# Patient Record
Sex: MALE | Race: WHITE | NOT HISPANIC OR LATINO | Employment: OTHER | URBAN - METROPOLITAN AREA
[De-identification: names, ages, dates, MRNs, and addresses within clinical notes are randomized per-mention and may not be internally consistent; named-entity substitution may affect disease eponyms.]

---

## 2017-01-01 ENCOUNTER — TELEPHONE (OUTPATIENT)
Dept: NEUROLOGY | Facility: HOSPITAL | Age: 77
End: 2017-01-01

## 2017-01-01 ENCOUNTER — HOSPITAL ENCOUNTER (INPATIENT)
Facility: HOSPITAL | Age: 77
LOS: 4 days | DRG: 435 | End: 2017-01-29
Attending: SURGERY | Admitting: SURGERY
Payer: MEDICARE

## 2017-01-01 ENCOUNTER — ANESTHESIA (OUTPATIENT)
Dept: MEDSURG UNIT | Facility: HOSPITAL | Age: 77
DRG: 435 | End: 2017-01-01
Payer: MEDICARE

## 2017-01-01 ENCOUNTER — TELEPHONE (OUTPATIENT)
Dept: TRANSPLANT | Facility: CLINIC | Age: 77
End: 2017-01-01

## 2017-01-01 ENCOUNTER — TELEPHONE (OUTPATIENT)
Dept: HEPATOLOGY | Facility: CLINIC | Age: 77
End: 2017-01-01

## 2017-01-01 ENCOUNTER — ANESTHESIA EVENT (OUTPATIENT)
Dept: MEDSURG UNIT | Facility: HOSPITAL | Age: 77
DRG: 435 | End: 2017-01-01
Payer: MEDICARE

## 2017-01-01 VITALS
RESPIRATION RATE: 5 BRPM | BODY MASS INDEX: 26.6 KG/M2 | SYSTOLIC BLOOD PRESSURE: 56 MMHG | WEIGHT: 190 LBS | DIASTOLIC BLOOD PRESSURE: 33 MMHG | OXYGEN SATURATION: 100 % | TEMPERATURE: 99 F | HEIGHT: 71 IN | HEART RATE: 15 BPM

## 2017-01-01 DIAGNOSIS — N18.6 ESRD (END STAGE RENAL DISEASE): ICD-10-CM

## 2017-01-01 DIAGNOSIS — N18.5 ANEMIA OF CHRONIC RENAL FAILURE, STAGE 5: ICD-10-CM

## 2017-01-01 DIAGNOSIS — K74.60 CIRRHOSIS OF LIVER WITH ASCITES, UNSPECIFIED HEPATIC CIRRHOSIS TYPE: ICD-10-CM

## 2017-01-01 DIAGNOSIS — E46 MALNUTRITION COMPROMISING BODILY FUNCTION: ICD-10-CM

## 2017-01-01 DIAGNOSIS — E43 EDEMA DUE TO MALNUTRITION, DUE TO UNSPECIFIED MALNUTRITION TYPE: ICD-10-CM

## 2017-01-01 DIAGNOSIS — T17.908A ASPIRATION INTO AIRWAY, INITIAL ENCOUNTER: ICD-10-CM

## 2017-01-01 DIAGNOSIS — J90 PLEURAL EFFUSION: ICD-10-CM

## 2017-01-01 DIAGNOSIS — C22.0 LIVER CELL CARCINOMA: ICD-10-CM

## 2017-01-01 DIAGNOSIS — D49.0 LIVER TUMOR: Primary | ICD-10-CM

## 2017-01-01 DIAGNOSIS — R18.8 CIRRHOSIS OF LIVER WITH ASCITES, UNSPECIFIED HEPATIC CIRRHOSIS TYPE: ICD-10-CM

## 2017-01-01 DIAGNOSIS — R16.0 LIVER MASSES: Primary | ICD-10-CM

## 2017-01-01 DIAGNOSIS — C22.0 HCC (HEPATOCELLULAR CARCINOMA): ICD-10-CM

## 2017-01-01 DIAGNOSIS — D63.1 ANEMIA OF CHRONIC RENAL FAILURE, STAGE 5: ICD-10-CM

## 2017-01-01 LAB
AFP SERPL-MCNC: ABNORMAL NG/ML
ALBUMIN SERPL BCP-MCNC: 1.5 G/DL
ALBUMIN SERPL BCP-MCNC: 1.8 G/DL
ALBUMIN SERPL BCP-MCNC: 2 G/DL
ALP SERPL-CCNC: 328 U/L
ALP SERPL-CCNC: 410 U/L
ALP SERPL-CCNC: 428 U/L
ALP SERPL-CCNC: 531 U/L
ALP SERPL-CCNC: 596 U/L
ALT SERPL W/O P-5'-P-CCNC: 17 U/L
ALT SERPL W/O P-5'-P-CCNC: 18 U/L
ALT SERPL W/O P-5'-P-CCNC: 18 U/L
ALT SERPL W/O P-5'-P-CCNC: 25 U/L
ALT SERPL W/O P-5'-P-CCNC: 30 U/L
AMMONIA PLAS-SCNC: 19 UMOL/L
ANION GAP SERPL CALC-SCNC: 11 MMOL/L
ANION GAP SERPL CALC-SCNC: 11 MMOL/L
ANION GAP SERPL CALC-SCNC: 12 MMOL/L
ANION GAP SERPL CALC-SCNC: 13 MMOL/L
ANION GAP SERPL CALC-SCNC: 14 MMOL/L
ANISOCYTOSIS BLD QL SMEAR: SLIGHT
APTT BLDCRRT: 32.6 SEC
AST SERPL-CCNC: 34 U/L
AST SERPL-CCNC: 40 U/L
AST SERPL-CCNC: 50 U/L
AST SERPL-CCNC: 58 U/L
AST SERPL-CCNC: 71 U/L
BASOPHILS # BLD AUTO: 0.01 K/UL
BASOPHILS # BLD AUTO: 0.01 K/UL
BASOPHILS # BLD AUTO: 0.02 K/UL
BASOPHILS # BLD AUTO: 0.02 K/UL
BASOPHILS # BLD AUTO: ABNORMAL K/UL
BASOPHILS NFR BLD: 0 %
BASOPHILS NFR BLD: 0.1 %
BASOPHILS NFR BLD: 0.1 %
BASOPHILS NFR BLD: 0.2 %
BASOPHILS NFR BLD: 0.2 %
BILIRUB SERPL-MCNC: 0.9 MG/DL
BILIRUB SERPL-MCNC: 0.9 MG/DL
BILIRUB SERPL-MCNC: 1 MG/DL
BILIRUB SERPL-MCNC: 1 MG/DL
BILIRUB SERPL-MCNC: 1.1 MG/DL
BUN SERPL-MCNC: 34 MG/DL
BUN SERPL-MCNC: 36 MG/DL
BUN SERPL-MCNC: 36 MG/DL
BUN SERPL-MCNC: 43 MG/DL
BUN SERPL-MCNC: 46 MG/DL
CALCIUM SERPL-MCNC: 8.2 MG/DL
CALCIUM SERPL-MCNC: 8.4 MG/DL
CALCIUM SERPL-MCNC: 8.6 MG/DL
CEA SERPL-MCNC: 5 NG/ML
CHLORIDE SERPL-SCNC: 92 MMOL/L
CHLORIDE SERPL-SCNC: 95 MMOL/L
CHLORIDE SERPL-SCNC: 96 MMOL/L
CHLORIDE SERPL-SCNC: 97 MMOL/L
CHLORIDE SERPL-SCNC: 98 MMOL/L
CO2 SERPL-SCNC: 19 MMOL/L
CO2 SERPL-SCNC: 20 MMOL/L
CO2 SERPL-SCNC: 21 MMOL/L
CO2 SERPL-SCNC: 22 MMOL/L
CO2 SERPL-SCNC: 23 MMOL/L
CREAT SERPL-MCNC: 4 MG/DL
CREAT SERPL-MCNC: 4.3 MG/DL
CREAT SERPL-MCNC: 4.5 MG/DL
CREAT SERPL-MCNC: 4.8 MG/DL
CREAT SERPL-MCNC: 5.3 MG/DL
DIFFERENTIAL METHOD: ABNORMAL
EOSINOPHIL # BLD AUTO: 0 K/UL
EOSINOPHIL # BLD AUTO: 0.1 K/UL
EOSINOPHIL # BLD AUTO: ABNORMAL K/UL
EOSINOPHIL NFR BLD: 0 %
EOSINOPHIL NFR BLD: 0.2 %
EOSINOPHIL NFR BLD: 0.3 %
EOSINOPHIL NFR BLD: 0.3 %
EOSINOPHIL NFR BLD: 0.7 %
ERYTHROCYTE [DISTWIDTH] IN BLOOD BY AUTOMATED COUNT: 17.6 %
ERYTHROCYTE [DISTWIDTH] IN BLOOD BY AUTOMATED COUNT: 17.6 %
ERYTHROCYTE [DISTWIDTH] IN BLOOD BY AUTOMATED COUNT: 17.8 %
ERYTHROCYTE [DISTWIDTH] IN BLOOD BY AUTOMATED COUNT: 17.8 %
ERYTHROCYTE [DISTWIDTH] IN BLOOD BY AUTOMATED COUNT: 18 %
EST. GFR  (AFRICAN AMERICAN): 11 ML/MIN/1.73 M^2
EST. GFR  (AFRICAN AMERICAN): 13 ML/MIN/1.73 M^2
EST. GFR  (AFRICAN AMERICAN): 14 ML/MIN/1.73 M^2
EST. GFR  (AFRICAN AMERICAN): 15 ML/MIN/1.73 M^2
EST. GFR  (AFRICAN AMERICAN): 16 ML/MIN/1.73 M^2
EST. GFR  (NON AFRICAN AMERICAN): 10 ML/MIN/1.73 M^2
EST. GFR  (NON AFRICAN AMERICAN): 11 ML/MIN/1.73 M^2
EST. GFR  (NON AFRICAN AMERICAN): 12 ML/MIN/1.73 M^2
EST. GFR  (NON AFRICAN AMERICAN): 13 ML/MIN/1.73 M^2
EST. GFR  (NON AFRICAN AMERICAN): 14 ML/MIN/1.73 M^2
GLUCOSE SERPL-MCNC: 137 MG/DL
GLUCOSE SERPL-MCNC: 151 MG/DL
GLUCOSE SERPL-MCNC: 154 MG/DL
GLUCOSE SERPL-MCNC: 181 MG/DL
GLUCOSE SERPL-MCNC: 197 MG/DL
HBV CORE IGM SERPL QL IA: NEGATIVE
HBV SURFACE AB SER-ACNC: NEGATIVE M[IU]/ML
HBV SURFACE AG SERPL QL IA: NEGATIVE
HCT VFR BLD AUTO: 26.5 %
HCT VFR BLD AUTO: 27.5 %
HCT VFR BLD AUTO: 28.4 %
HCT VFR BLD AUTO: 29.4 %
HCT VFR BLD AUTO: 31.2 %
HGB BLD-MCNC: 10.1 G/DL
HGB BLD-MCNC: 8.3 G/DL
HGB BLD-MCNC: 8.6 G/DL
HGB BLD-MCNC: 9 G/DL
HGB BLD-MCNC: 9.5 G/DL
HYPOCHROMIA BLD QL SMEAR: ABNORMAL
HYPOCHROMIA BLD QL SMEAR: ABNORMAL
INR PPP: 1.1
LYMPHOCYTES # BLD AUTO: 1.2 K/UL
LYMPHOCYTES # BLD AUTO: 1.3 K/UL
LYMPHOCYTES # BLD AUTO: 1.6 K/UL
LYMPHOCYTES # BLD AUTO: 2.4 K/UL
LYMPHOCYTES # BLD AUTO: ABNORMAL K/UL
LYMPHOCYTES NFR BLD: 13.6 %
LYMPHOCYTES NFR BLD: 13.9 %
LYMPHOCYTES NFR BLD: 16 %
LYMPHOCYTES NFR BLD: 17.9 %
LYMPHOCYTES NFR BLD: 26.1 %
MAGNESIUM SERPL-MCNC: 1.6 MG/DL
MAGNESIUM SERPL-MCNC: 1.6 MG/DL
MAGNESIUM SERPL-MCNC: 1.7 MG/DL
MAGNESIUM SERPL-MCNC: 1.7 MG/DL
MAGNESIUM SERPL-MCNC: 1.8 MG/DL
MCH RBC QN AUTO: 27 PG
MCH RBC QN AUTO: 27.2 PG
MCH RBC QN AUTO: 27.2 PG
MCH RBC QN AUTO: 27.3 PG
MCH RBC QN AUTO: 27.7 PG
MCHC RBC AUTO-ENTMCNC: 31.3 %
MCHC RBC AUTO-ENTMCNC: 31.3 %
MCHC RBC AUTO-ENTMCNC: 31.7 %
MCHC RBC AUTO-ENTMCNC: 32.3 %
MCHC RBC AUTO-ENTMCNC: 32.4 %
MCV RBC AUTO: 84 FL
MCV RBC AUTO: 86 FL
MCV RBC AUTO: 86 FL
MCV RBC AUTO: 87 FL
MCV RBC AUTO: 87 FL
MONOCYTES # BLD AUTO: 0.6 K/UL
MONOCYTES # BLD AUTO: 0.6 K/UL
MONOCYTES # BLD AUTO: 0.7 K/UL
MONOCYTES # BLD AUTO: 0.8 K/UL
MONOCYTES # BLD AUTO: ABNORMAL K/UL
MONOCYTES NFR BLD: 5 %
MONOCYTES NFR BLD: 5.7 %
MONOCYTES NFR BLD: 7 %
MONOCYTES NFR BLD: 8.7 %
MONOCYTES NFR BLD: 8.9 %
NEUTROPHILS # BLD AUTO: 4.9 K/UL
NEUTROPHILS # BLD AUTO: 6 K/UL
NEUTROPHILS # BLD AUTO: 7.1 K/UL
NEUTROPHILS # BLD AUTO: 9 K/UL
NEUTROPHILS NFR BLD: 64.8 %
NEUTROPHILS NFR BLD: 69 %
NEUTROPHILS NFR BLD: 72.4 %
NEUTROPHILS NFR BLD: 78.7 %
NEUTROPHILS NFR BLD: 80.2 %
NEUTS BAND NFR BLD MANUAL: 10 %
PHOSPHATE SERPL-MCNC: 3.2 MG/DL
PHOSPHATE SERPL-MCNC: 3.7 MG/DL
PHOSPHATE SERPL-MCNC: 3.8 MG/DL
PHOSPHATE SERPL-MCNC: 3.9 MG/DL
PHOSPHATE SERPL-MCNC: 4.7 MG/DL
PLATELET # BLD AUTO: 128 K/UL
PLATELET # BLD AUTO: 77 K/UL
PLATELET # BLD AUTO: 77 K/UL
PLATELET # BLD AUTO: 81 K/UL
PLATELET # BLD AUTO: 95 K/UL
PLATELET BLD QL SMEAR: ABNORMAL
PMV BLD AUTO: 10.9 FL
PMV BLD AUTO: 11.3 FL
PMV BLD AUTO: 11.9 FL
PMV BLD AUTO: 12.1 FL
PMV BLD AUTO: ABNORMAL FL
POCT GLUCOSE: 145 MG/DL (ref 70–110)
POCT GLUCOSE: 147 MG/DL (ref 70–110)
POCT GLUCOSE: 150 MG/DL (ref 70–110)
POCT GLUCOSE: 154 MG/DL (ref 70–110)
POCT GLUCOSE: 159 MG/DL (ref 70–110)
POCT GLUCOSE: 160 MG/DL (ref 70–110)
POCT GLUCOSE: 164 MG/DL (ref 70–110)
POCT GLUCOSE: 174 MG/DL (ref 70–110)
POCT GLUCOSE: 174 MG/DL (ref 70–110)
POCT GLUCOSE: 181 MG/DL (ref 70–110)
POCT GLUCOSE: 181 MG/DL (ref 70–110)
POCT GLUCOSE: 187 MG/DL (ref 70–110)
POCT GLUCOSE: 205 MG/DL (ref 70–110)
POIKILOCYTOSIS BLD QL SMEAR: ABNORMAL
POLYCHROMASIA BLD QL SMEAR: ABNORMAL
POLYCHROMASIA BLD QL SMEAR: ABNORMAL
POTASSIUM SERPL-SCNC: 4.2 MMOL/L
POTASSIUM SERPL-SCNC: 4.3 MMOL/L
POTASSIUM SERPL-SCNC: 4.4 MMOL/L
POTASSIUM SERPL-SCNC: 4.4 MMOL/L
POTASSIUM SERPL-SCNC: 4.8 MMOL/L
PROT SERPL-MCNC: 5.3 G/DL
PROT SERPL-MCNC: 5.5 G/DL
PROT SERPL-MCNC: 5.6 G/DL
PROTHROMBIN TIME: 12.1 SEC
RBC # BLD AUTO: 3.05 M/UL
RBC # BLD AUTO: 3.18 M/UL
RBC # BLD AUTO: 3.3 M/UL
RBC # BLD AUTO: 3.49 M/UL
RBC # BLD AUTO: 3.64 M/UL
SODIUM SERPL-SCNC: 126 MMOL/L
SODIUM SERPL-SCNC: 128 MMOL/L
SODIUM SERPL-SCNC: 129 MMOL/L
SODIUM SERPL-SCNC: 130 MMOL/L
SODIUM SERPL-SCNC: 131 MMOL/L
T3FREE SERPL-MCNC: <1 PG/ML
T4 FREE SERPL-MCNC: 0.58 NG/DL
TSH SERPL DL<=0.005 MIU/L-ACNC: 6.34 UIU/ML
WBC # BLD AUTO: 11.39 K/UL
WBC # BLD AUTO: 14.2 K/UL
WBC # BLD AUTO: 6.82 K/UL
WBC # BLD AUTO: 9.08 K/UL
WBC # BLD AUTO: 9.34 K/UL

## 2017-01-01 PROCEDURE — G8997 SWALLOW GOAL STATUS: HCPCS | Mod: CL

## 2017-01-01 PROCEDURE — 85007 BL SMEAR W/DIFF WBC COUNT: CPT

## 2017-01-01 PROCEDURE — 85025 COMPLETE CBC W/AUTO DIFF WBC: CPT

## 2017-01-01 PROCEDURE — 5A1935Z RESPIRATORY VENTILATION, LESS THAN 24 CONSECUTIVE HOURS: ICD-10-PCS | Performed by: INTERNAL MEDICINE

## 2017-01-01 PROCEDURE — 92610 EVALUATE SWALLOWING FUNCTION: CPT

## 2017-01-01 PROCEDURE — 94761 N-INVAS EAR/PLS OXIMETRY MLT: CPT

## 2017-01-01 PROCEDURE — 25000003 PHARM REV CODE 250: Performed by: INTERNAL MEDICINE

## 2017-01-01 PROCEDURE — 27000221 HC OXYGEN, UP TO 24 HOURS

## 2017-01-01 PROCEDURE — 11000001 HC ACUTE MED/SURG PRIVATE ROOM

## 2017-01-01 PROCEDURE — 80053 COMPREHEN METABOLIC PANEL: CPT

## 2017-01-01 PROCEDURE — G8996 SWALLOW CURRENT STATUS: HCPCS | Mod: CM

## 2017-01-01 PROCEDURE — 25000242 PHARM REV CODE 250 ALT 637 W/ HCPCS: Performed by: STUDENT IN AN ORGANIZED HEALTH CARE EDUCATION/TRAINING PROGRAM

## 2017-01-01 PROCEDURE — 97530 THERAPEUTIC ACTIVITIES: CPT

## 2017-01-01 PROCEDURE — 94640 AIRWAY INHALATION TREATMENT: CPT

## 2017-01-01 PROCEDURE — 63600175 PHARM REV CODE 636 W HCPCS: Performed by: SURGERY

## 2017-01-01 PROCEDURE — 94799 UNLISTED PULMONARY SVC/PX: CPT

## 2017-01-01 PROCEDURE — 27201247 HC HEMODIALYSIS, SET-UP & CANCEL

## 2017-01-01 PROCEDURE — 85730 THROMBOPLASTIN TIME PARTIAL: CPT

## 2017-01-01 PROCEDURE — 85610 PROTHROMBIN TIME: CPT

## 2017-01-01 PROCEDURE — 63600175 PHARM REV CODE 636 W HCPCS: Performed by: STUDENT IN AN ORGANIZED HEALTH CARE EDUCATION/TRAINING PROGRAM

## 2017-01-01 PROCEDURE — 87040 BLOOD CULTURE FOR BACTERIA: CPT

## 2017-01-01 PROCEDURE — 84100 ASSAY OF PHOSPHORUS: CPT

## 2017-01-01 PROCEDURE — 83735 ASSAY OF MAGNESIUM: CPT

## 2017-01-01 PROCEDURE — 99222 1ST HOSP IP/OBS MODERATE 55: CPT | Mod: ,,, | Performed by: INTERNAL MEDICINE

## 2017-01-01 PROCEDURE — 97802 MEDICAL NUTRITION INDIV IN: CPT

## 2017-01-01 PROCEDURE — 82105 ALPHA-FETOPROTEIN SERUM: CPT

## 2017-01-01 PROCEDURE — 82378 CARCINOEMBRYONIC ANTIGEN: CPT

## 2017-01-01 PROCEDURE — 84481 FREE ASSAY (FT-3): CPT

## 2017-01-01 PROCEDURE — 85027 COMPLETE CBC AUTOMATED: CPT

## 2017-01-01 PROCEDURE — 0BH17EZ INSERTION OF ENDOTRACHEAL AIRWAY INTO TRACHEA, VIA NATURAL OR ARTIFICIAL OPENING: ICD-10-PCS | Performed by: INTERNAL MEDICINE

## 2017-01-01 PROCEDURE — 02HV33Z INSERTION OF INFUSION DEVICE INTO SUPERIOR VENA CAVA, PERCUTANEOUS APPROACH: ICD-10-PCS | Performed by: ANESTHESIOLOGY

## 2017-01-01 PROCEDURE — 36415 COLL VENOUS BLD VENIPUNCTURE: CPT

## 2017-01-01 PROCEDURE — 25000003 PHARM REV CODE 250: Performed by: STUDENT IN AN ORGANIZED HEALTH CARE EDUCATION/TRAINING PROGRAM

## 2017-01-01 PROCEDURE — 97803 MED NUTRITION INDIV SUBSEQ: CPT

## 2017-01-01 PROCEDURE — 63600175 PHARM REV CODE 636 W HCPCS: Performed by: INTERNAL MEDICINE

## 2017-01-01 PROCEDURE — 25000003 PHARM REV CODE 250: Performed by: SURGERY

## 2017-01-01 PROCEDURE — 80100016 HC MAINTENANCE HEMODIALYSIS

## 2017-01-01 PROCEDURE — 86580 TB INTRADERMAL TEST: CPT | Performed by: STUDENT IN AN ORGANIZED HEALTH CARE EDUCATION/TRAINING PROGRAM

## 2017-01-01 PROCEDURE — G8978 MOBILITY CURRENT STATUS: HCPCS | Mod: CL

## 2017-01-01 PROCEDURE — G8987 SELF CARE CURRENT STATUS: HCPCS | Mod: CL

## 2017-01-01 PROCEDURE — 36416 COLLJ CAPILLARY BLOOD SPEC: CPT | Performed by: ANESTHESIOLOGY

## 2017-01-01 PROCEDURE — 84443 ASSAY THYROID STIM HORMONE: CPT

## 2017-01-01 PROCEDURE — 92950 HEART/LUNG RESUSCITATION CPR: CPT

## 2017-01-01 PROCEDURE — P9047 ALBUMIN (HUMAN), 25%, 50ML: HCPCS | Performed by: INTERNAL MEDICINE

## 2017-01-01 PROCEDURE — 97535 SELF CARE MNGMENT TRAINING: CPT

## 2017-01-01 PROCEDURE — G8988 SELF CARE GOAL STATUS: HCPCS | Mod: CK

## 2017-01-01 PROCEDURE — P9047 ALBUMIN (HUMAN), 25%, 50ML: HCPCS | Performed by: STUDENT IN AN ORGANIZED HEALTH CARE EDUCATION/TRAINING PROGRAM

## 2017-01-01 PROCEDURE — 94002 VENT MGMT INPAT INIT DAY: CPT

## 2017-01-01 PROCEDURE — 36556 INSERT NON-TUNNEL CV CATH: CPT

## 2017-01-01 PROCEDURE — G8979 MOBILITY GOAL STATUS: HCPCS | Mod: CK

## 2017-01-01 PROCEDURE — 87340 HEPATITIS B SURFACE AG IA: CPT

## 2017-01-01 PROCEDURE — 97162 PT EVAL MOD COMPLEX 30 MIN: CPT

## 2017-01-01 PROCEDURE — 5A1D60Z PERFORMANCE OF URINARY FILTRATION, MULTIPLE: ICD-10-PCS | Performed by: INTERNAL MEDICINE

## 2017-01-01 PROCEDURE — 84439 ASSAY OF FREE THYROXINE: CPT

## 2017-01-01 PROCEDURE — C1894 INTRO/SHEATH, NON-LASER: HCPCS | Performed by: ANESTHESIOLOGY

## 2017-01-01 PROCEDURE — 93005 ELECTROCARDIOGRAM TRACING: CPT

## 2017-01-01 PROCEDURE — 86705 HEP B CORE ANTIBODY IGM: CPT

## 2017-01-01 PROCEDURE — 97166 OT EVAL MOD COMPLEX 45 MIN: CPT

## 2017-01-01 PROCEDURE — 86706 HEP B SURFACE ANTIBODY: CPT

## 2017-01-01 PROCEDURE — 82140 ASSAY OF AMMONIA: CPT

## 2017-01-01 RX ORDER — CLOPIDOGREL BISULFATE 75 MG/1
75 TABLET ORAL DAILY
Status: DISCONTINUED | OUTPATIENT
Start: 2017-01-01 | End: 2017-01-01

## 2017-01-01 RX ORDER — TIZANIDINE 4 MG/1
4 TABLET ORAL EVERY 8 HOURS PRN
Status: DISCONTINUED | OUTPATIENT
Start: 2017-01-01 | End: 2017-01-01 | Stop reason: HOSPADM

## 2017-01-01 RX ORDER — AMIODARONE HYDROCHLORIDE 200 MG/1
200 TABLET ORAL 2 TIMES DAILY
Status: DISCONTINUED | OUTPATIENT
Start: 2017-01-01 | End: 2017-01-01 | Stop reason: HOSPADM

## 2017-01-01 RX ORDER — ONDANSETRON 8 MG/1
8 TABLET, ORALLY DISINTEGRATING ORAL EVERY 8 HOURS PRN
Status: DISCONTINUED | OUTPATIENT
Start: 2017-01-01 | End: 2017-01-01 | Stop reason: HOSPADM

## 2017-01-01 RX ORDER — ZOLPIDEM TARTRATE 5 MG/1
5 TABLET ORAL NIGHTLY PRN
Status: DISCONTINUED | OUTPATIENT
Start: 2017-01-01 | End: 2017-01-01 | Stop reason: ALTCHOICE

## 2017-01-01 RX ORDER — DIPHENHYDRAMINE HYDROCHLORIDE 50 MG/ML
25 INJECTION INTRAMUSCULAR; INTRAVENOUS EVERY 4 HOURS PRN
Status: DISCONTINUED | OUTPATIENT
Start: 2017-01-01 | End: 2017-01-01 | Stop reason: HOSPADM

## 2017-01-01 RX ORDER — AMLODIPINE BESYLATE 5 MG/1
5 TABLET ORAL DAILY
Status: DISCONTINUED | OUTPATIENT
Start: 2017-01-01 | End: 2017-01-01 | Stop reason: HOSPADM

## 2017-01-01 RX ORDER — TAMSULOSIN HYDROCHLORIDE 0.4 MG/1
0.4 CAPSULE ORAL DAILY
Status: DISCONTINUED | OUTPATIENT
Start: 2017-01-01 | End: 2017-01-01 | Stop reason: HOSPADM

## 2017-01-01 RX ORDER — ASPIRIN 81 MG/1
81 TABLET ORAL DAILY
Status: DISCONTINUED | OUTPATIENT
Start: 2017-01-01 | End: 2017-01-01

## 2017-01-01 RX ORDER — LISINOPRIL 10 MG/1
10 TABLET ORAL DAILY
Status: DISCONTINUED | OUTPATIENT
Start: 2017-01-01 | End: 2017-01-01 | Stop reason: HOSPADM

## 2017-01-01 RX ORDER — SODIUM CHLORIDE 0.9 % (FLUSH) 0.9 %
3 SYRINGE (ML) INJECTION EVERY 8 HOURS
Status: DISCONTINUED | OUTPATIENT
Start: 2017-01-01 | End: 2017-01-01 | Stop reason: HOSPADM

## 2017-01-01 RX ORDER — CARVEDILOL 3.12 MG/1
3.12 TABLET ORAL 2 TIMES DAILY
Status: DISCONTINUED | OUTPATIENT
Start: 2017-01-01 | End: 2017-01-01 | Stop reason: SDUPTHER

## 2017-01-01 RX ORDER — OXYBUTYNIN CHLORIDE 5 MG/1
10 TABLET, EXTENDED RELEASE ORAL DAILY
Status: DISCONTINUED | OUTPATIENT
Start: 2017-01-01 | End: 2017-01-01 | Stop reason: HOSPADM

## 2017-01-01 RX ORDER — SODIUM CHLORIDE 9 MG/ML
INJECTION, SOLUTION INTRAVENOUS
Status: DISCONTINUED | OUTPATIENT
Start: 2017-01-01 | End: 2017-01-01 | Stop reason: HOSPADM

## 2017-01-01 RX ORDER — SPIRONOLACTONE 25 MG/1
25 TABLET ORAL DAILY
Status: DISCONTINUED | OUTPATIENT
Start: 2017-01-01 | End: 2017-01-01 | Stop reason: HOSPADM

## 2017-01-01 RX ORDER — ALBUMIN HUMAN 250 G/1000ML
12.5 SOLUTION INTRAVENOUS
Status: DISCONTINUED | OUTPATIENT
Start: 2017-01-01 | End: 2017-01-01 | Stop reason: HOSPADM

## 2017-01-01 RX ORDER — ENOXAPARIN SODIUM 100 MG/ML
30 INJECTION SUBCUTANEOUS EVERY 24 HOURS
Status: DISCONTINUED | OUTPATIENT
Start: 2017-01-01 | End: 2017-01-01 | Stop reason: HOSPADM

## 2017-01-01 RX ORDER — ACETAMINOPHEN 650 MG/1
650 SUPPOSITORY RECTAL EVERY 8 HOURS PRN
Status: DISCONTINUED | OUTPATIENT
Start: 2017-01-01 | End: 2017-01-01 | Stop reason: HOSPADM

## 2017-01-01 RX ORDER — HYDROCODONE BITARTRATE AND ACETAMINOPHEN 5; 325 MG/1; MG/1
1 TABLET ORAL EVERY 6 HOURS PRN
Status: DISCONTINUED | OUTPATIENT
Start: 2017-01-01 | End: 2017-01-01 | Stop reason: HOSPADM

## 2017-01-01 RX ORDER — SODIUM CHLORIDE 9 MG/ML
INJECTION, SOLUTION INTRAVENOUS ONCE
Status: DISCONTINUED | OUTPATIENT
Start: 2017-01-01 | End: 2017-01-01 | Stop reason: HOSPADM

## 2017-01-01 RX ORDER — ALBUMIN HUMAN 250 G/1000ML
25 SOLUTION INTRAVENOUS ONCE
Status: COMPLETED | OUTPATIENT
Start: 2017-01-01 | End: 2017-01-01

## 2017-01-01 RX ORDER — SODIUM CHLORIDE, SODIUM LACTATE, POTASSIUM CHLORIDE, CALCIUM CHLORIDE 600; 310; 30; 20 MG/100ML; MG/100ML; MG/100ML; MG/100ML
INJECTION, SOLUTION INTRAVENOUS CONTINUOUS
Status: DISCONTINUED | OUTPATIENT
Start: 2017-01-01 | End: 2017-01-01

## 2017-01-01 RX ORDER — GLUCAGON 1 MG
1 KIT INJECTION
Status: DISCONTINUED | OUTPATIENT
Start: 2017-01-01 | End: 2017-01-01 | Stop reason: SDUPTHER

## 2017-01-01 RX ORDER — ALBUMIN HUMAN 250 G/1000ML
25 SOLUTION INTRAVENOUS DAILY PRN
Status: DISCONTINUED | OUTPATIENT
Start: 2017-01-01 | End: 2017-01-01 | Stop reason: HOSPADM

## 2017-01-01 RX ORDER — INSULIN ASPART 100 [IU]/ML
0-5 INJECTION, SOLUTION INTRAVENOUS; SUBCUTANEOUS EVERY 6 HOURS PRN
Status: DISCONTINUED | OUTPATIENT
Start: 2017-01-01 | End: 2017-01-01 | Stop reason: HOSPADM

## 2017-01-01 RX ORDER — ENOXAPARIN SODIUM 100 MG/ML
40 INJECTION SUBCUTANEOUS EVERY 24 HOURS
Status: DISCONTINUED | OUTPATIENT
Start: 2017-01-01 | End: 2017-01-01

## 2017-01-01 RX ORDER — GLUCAGON 1 MG
1 KIT INJECTION
Status: DISCONTINUED | OUTPATIENT
Start: 2017-01-01 | End: 2017-01-01 | Stop reason: HOSPADM

## 2017-01-01 RX ORDER — METOLAZONE 2.5 MG/1
2.5 TABLET ORAL DAILY
Status: DISCONTINUED | OUTPATIENT
Start: 2017-01-01 | End: 2017-01-01 | Stop reason: HOSPADM

## 2017-01-01 RX ORDER — ALBUTEROL SULFATE 90 UG/1
2 AEROSOL, METERED RESPIRATORY (INHALATION) EVERY 4 HOURS PRN
Status: DISCONTINUED | OUTPATIENT
Start: 2017-01-01 | End: 2017-01-01 | Stop reason: HOSPADM

## 2017-01-01 RX ORDER — MORPHINE SULFATE 2 MG/ML
2 INJECTION, SOLUTION INTRAMUSCULAR; INTRAVENOUS
Status: DISCONTINUED | OUTPATIENT
Start: 2017-01-01 | End: 2017-01-01 | Stop reason: HOSPADM

## 2017-01-01 RX ORDER — FLUTICASONE FUROATE AND VILANTEROL 100; 25 UG/1; UG/1
1 POWDER RESPIRATORY (INHALATION) DAILY
Status: DISCONTINUED | OUTPATIENT
Start: 2017-01-01 | End: 2017-01-01 | Stop reason: HOSPADM

## 2017-01-01 RX ORDER — ATROPINE SULFATE 1 MG/ML
INJECTION, SOLUTION INTRAMUSCULAR; INTRAVENOUS; SUBCUTANEOUS CODE/TRAUMA/SEDATION MEDICATION
Status: COMPLETED | OUTPATIENT
Start: 2017-01-01 | End: 2017-01-01

## 2017-01-01 RX ORDER — FUROSEMIDE 40 MG/1
40 TABLET ORAL DAILY
Status: DISCONTINUED | OUTPATIENT
Start: 2017-01-01 | End: 2017-01-01 | Stop reason: HOSPADM

## 2017-01-01 RX ORDER — METOPROLOL SUCCINATE 50 MG/1
50 TABLET, EXTENDED RELEASE ORAL DAILY
Status: DISCONTINUED | OUTPATIENT
Start: 2017-01-01 | End: 2017-01-01 | Stop reason: HOSPADM

## 2017-01-01 RX ORDER — INSULIN ASPART 100 [IU]/ML
0-5 INJECTION, SOLUTION INTRAVENOUS; SUBCUTANEOUS EVERY 6 HOURS PRN
Status: DISCONTINUED | OUTPATIENT
Start: 2017-01-01 | End: 2017-01-01 | Stop reason: SDUPTHER

## 2017-01-01 RX ORDER — SILVER SULFADIAZINE 10 G/1000G
CREAM TOPICAL DAILY
Status: DISCONTINUED | OUTPATIENT
Start: 2017-01-01 | End: 2017-01-01 | Stop reason: HOSPADM

## 2017-01-01 RX ORDER — ACETAMINOPHEN 325 MG/1
650 TABLET ORAL EVERY 8 HOURS PRN
Status: DISCONTINUED | OUTPATIENT
Start: 2017-01-01 | End: 2017-01-01 | Stop reason: HOSPADM

## 2017-01-01 RX ORDER — EPINEPHRINE 1 MG/ML
INJECTION INTRAMUSCULAR; INTRAVENOUS; SUBCUTANEOUS CODE/TRAUMA/SEDATION MEDICATION
Status: COMPLETED | OUTPATIENT
Start: 2017-01-01 | End: 2017-01-01

## 2017-01-01 RX ORDER — MECLIZINE HYDROCHLORIDE 25 MG/1
25 TABLET ORAL 3 TIMES DAILY PRN
Status: DISCONTINUED | OUTPATIENT
Start: 2017-01-01 | End: 2017-01-01 | Stop reason: HOSPADM

## 2017-01-01 RX ORDER — LATANOPROST 50 UG/ML
1 SOLUTION/ DROPS OPHTHALMIC NIGHTLY
Status: DISCONTINUED | OUTPATIENT
Start: 2017-01-01 | End: 2017-01-01 | Stop reason: HOSPADM

## 2017-01-01 RX ORDER — BENZONATATE 100 MG/1
100 CAPSULE ORAL EVERY 4 HOURS PRN
Status: DISCONTINUED | OUTPATIENT
Start: 2017-01-01 | End: 2017-01-01 | Stop reason: HOSPADM

## 2017-01-01 RX ORDER — ALBUTEROL SULFATE 2.5 MG/.5ML
2.5 SOLUTION RESPIRATORY (INHALATION) EVERY 4 HOURS
Status: DISCONTINUED | OUTPATIENT
Start: 2017-01-01 | End: 2017-01-01 | Stop reason: HOSPADM

## 2017-01-01 RX ORDER — MIDODRINE HYDROCHLORIDE 5 MG/1
5 TABLET ORAL
Status: DISCONTINUED | OUTPATIENT
Start: 2017-01-01 | End: 2017-01-01

## 2017-01-01 RX ORDER — RAMELTEON 8 MG/1
8 TABLET ORAL NIGHTLY PRN
Status: DISCONTINUED | OUTPATIENT
Start: 2017-01-01 | End: 2017-01-01 | Stop reason: HOSPADM

## 2017-01-01 RX ORDER — HYDRALAZINE HYDROCHLORIDE 25 MG/1
50 TABLET, FILM COATED ORAL 2 TIMES DAILY
Status: DISCONTINUED | OUTPATIENT
Start: 2017-01-01 | End: 2017-01-01 | Stop reason: HOSPADM

## 2017-01-01 RX ADMIN — ALBUTEROL SULFATE 2.5 MG: 2.5 SOLUTION RESPIRATORY (INHALATION) at 04:01

## 2017-01-01 RX ADMIN — ALBUTEROL SULFATE 2.5 MG: 2.5 SOLUTION RESPIRATORY (INHALATION) at 09:01

## 2017-01-01 RX ADMIN — OXYBUTYNIN CHLORIDE 10 MG: 5 TABLET, EXTENDED RELEASE ORAL at 10:01

## 2017-01-01 RX ADMIN — AMIODARONE HYDROCHLORIDE 200 MG: 200 TABLET ORAL at 09:01

## 2017-01-01 RX ADMIN — HYDRALAZINE HYDROCHLORIDE 50 MG: 25 TABLET, FILM COATED ORAL at 09:01

## 2017-01-01 RX ADMIN — ENOXAPARIN SODIUM 30 MG: 100 INJECTION SUBCUTANEOUS at 11:01

## 2017-01-01 RX ADMIN — DIPHENHYDRAMINE HYDROCHLORIDE 25 MG: 50 INJECTION, SOLUTION INTRAMUSCULAR; INTRAVENOUS at 03:01

## 2017-01-01 RX ADMIN — TUBERCULIN PURIFIED PROTEIN DERIVATIVE 5 UNITS: 5 INJECTION, SOLUTION INTRADERMAL at 08:01

## 2017-01-01 RX ADMIN — TAMSULOSIN HYDROCHLORIDE 0.4 MG: 0.4 CAPSULE ORAL at 10:01

## 2017-01-01 RX ADMIN — OXYBUTYNIN CHLORIDE 10 MG: 5 TABLET, EXTENDED RELEASE ORAL at 08:01

## 2017-01-01 RX ADMIN — HYDROCODONE BITARTRATE AND ACETAMINOPHEN 1 TABLET: 5; 325 TABLET ORAL at 09:01

## 2017-01-01 RX ADMIN — ALBUTEROL SULFATE 2.5 MG: 2.5 SOLUTION RESPIRATORY (INHALATION) at 07:01

## 2017-01-01 RX ADMIN — ALBUTEROL SULFATE 2.5 MG: 2.5 SOLUTION RESPIRATORY (INHALATION) at 12:01

## 2017-01-01 RX ADMIN — ATROPINE SULFATE 1 MG: 1 INJECTION, SOLUTION INTRAMUSCULAR; INTRAVENOUS; SUBCUTANEOUS at 12:01

## 2017-01-01 RX ADMIN — SPIRONOLACTONE 25 MG: 25 TABLET ORAL at 12:01

## 2017-01-01 RX ADMIN — SODIUM CHLORIDE, PRESERVATIVE FREE 3 ML: 5 INJECTION INTRAVENOUS at 02:01

## 2017-01-01 RX ADMIN — SODIUM CHLORIDE, PRESERVATIVE FREE 3 ML: 5 INJECTION INTRAVENOUS at 09:01

## 2017-01-01 RX ADMIN — ALBUTEROL SULFATE 2.5 MG: 2.5 SOLUTION RESPIRATORY (INHALATION) at 05:01

## 2017-01-01 RX ADMIN — FUROSEMIDE 40 MG: 40 TABLET ORAL at 09:01

## 2017-01-01 RX ADMIN — ENOXAPARIN SODIUM 30 MG: 100 INJECTION SUBCUTANEOUS at 12:01

## 2017-01-01 RX ADMIN — HYDROCODONE BITARTRATE AND ACETAMINOPHEN 1 TABLET: 5; 325 TABLET ORAL at 08:01

## 2017-01-01 RX ADMIN — ALBUTEROL SULFATE 2.5 MG: 2.5 SOLUTION RESPIRATORY (INHALATION) at 08:01

## 2017-01-01 RX ADMIN — SODIUM CHLORIDE, PRESERVATIVE FREE 3 ML: 5 INJECTION INTRAVENOUS at 05:01

## 2017-01-01 RX ADMIN — FUROSEMIDE 40 MG: 40 TABLET ORAL at 10:01

## 2017-01-01 RX ADMIN — HYDROCODONE BITARTRATE AND ACETAMINOPHEN 1 TABLET: 5; 325 TABLET ORAL at 01:01

## 2017-01-01 RX ADMIN — FLUTICASONE FUROATE AND VILANTEROL TRIFENATATE 1 PUFF: 100; 25 POWDER RESPIRATORY (INHALATION) at 09:01

## 2017-01-01 RX ADMIN — CLOPIDOGREL BISULFATE 75 MG: 75 TABLET ORAL at 09:01

## 2017-01-01 RX ADMIN — SPIRONOLACTONE 25 MG: 25 TABLET ORAL at 09:01

## 2017-01-01 RX ADMIN — AMIODARONE HYDROCHLORIDE 200 MG: 200 TABLET ORAL at 08:01

## 2017-01-01 RX ADMIN — HYDROCODONE BITARTRATE AND ACETAMINOPHEN 1 TABLET: 5; 325 TABLET ORAL at 04:01

## 2017-01-01 RX ADMIN — TAMSULOSIN HYDROCHLORIDE 0.4 MG: 0.4 CAPSULE ORAL at 09:01

## 2017-01-01 RX ADMIN — HYDROCODONE BITARTRATE AND ACETAMINOPHEN 1 TABLET: 5; 325 TABLET ORAL at 03:01

## 2017-01-01 RX ADMIN — SPIRONOLACTONE 25 MG: 25 TABLET ORAL at 08:01

## 2017-01-01 RX ADMIN — METOPROLOL SUCCINATE 50 MG: 50 TABLET, EXTENDED RELEASE ORAL at 12:01

## 2017-01-01 RX ADMIN — METOLAZONE 2.5 MG: 2.5 TABLET ORAL at 09:01

## 2017-01-01 RX ADMIN — HYDROCODONE BITARTRATE AND ACETAMINOPHEN 1 TABLET: 5; 325 TABLET ORAL at 10:01

## 2017-01-01 RX ADMIN — TAMSULOSIN HYDROCHLORIDE 0.4 MG: 0.4 CAPSULE ORAL at 08:01

## 2017-01-01 RX ADMIN — ONDANSETRON 8 MG: 8 TABLET, ORALLY DISINTEGRATING ORAL at 09:01

## 2017-01-01 RX ADMIN — ALBUMIN (HUMAN) 25 G: 12.5 SOLUTION INTRAVENOUS at 10:01

## 2017-01-01 RX ADMIN — EPINEPHRINE 1 MG: 1 INJECTION PARENTERAL at 12:01

## 2017-01-01 RX ADMIN — ALBUTEROL SULFATE 2.5 MG: 2.5 SOLUTION RESPIRATORY (INHALATION) at 02:01

## 2017-01-01 RX ADMIN — AMLODIPINE BESYLATE 5 MG: 5 TABLET ORAL at 12:01

## 2017-01-01 RX ADMIN — AMIODARONE HYDROCHLORIDE 200 MG: 200 TABLET ORAL at 12:01

## 2017-01-01 RX ADMIN — SILVER SULFADIAZINE: 10 CREAM TOPICAL at 01:01

## 2017-01-01 RX ADMIN — HYDROCODONE BITARTRATE AND ACETAMINOPHEN 1 TABLET: 5; 325 TABLET ORAL at 05:01

## 2017-01-01 RX ADMIN — ALBUTEROL SULFATE 2.5 MG: 2.5 SOLUTION RESPIRATORY (INHALATION) at 11:01

## 2017-01-01 RX ADMIN — ALBUMIN (HUMAN) 25 G: 12.5 SOLUTION INTRAVENOUS at 02:01

## 2017-01-01 RX ADMIN — SODIUM CHLORIDE, PRESERVATIVE FREE 3 ML: 5 INJECTION INTRAVENOUS at 03:01

## 2017-01-01 RX ADMIN — ALBUMIN (HUMAN) 25 G: 12.5 SOLUTION INTRAVENOUS at 08:01

## 2017-01-01 RX ADMIN — AMLODIPINE BESYLATE 5 MG: 5 TABLET ORAL at 09:01

## 2017-01-01 RX ADMIN — SPIRONOLACTONE 25 MG: 25 TABLET ORAL at 10:01

## 2017-01-01 RX ADMIN — ALBUTEROL SULFATE 2.5 MG: 2.5 SOLUTION RESPIRATORY (INHALATION) at 03:01

## 2017-01-01 RX ADMIN — HYDRALAZINE HYDROCHLORIDE 50 MG: 25 TABLET, FILM COATED ORAL at 12:01

## 2017-01-01 RX ADMIN — SILVER SULFADIAZINE: 10 CREAM TOPICAL at 08:01

## 2017-01-01 RX ADMIN — AMIODARONE HYDROCHLORIDE 200 MG: 200 TABLET ORAL at 10:01

## 2017-01-01 RX ADMIN — LEUCINE, PHENYLALANINE, LYSINE, METHIONINE, ISOLEUCINE, VALINE, HISTIDINE, THREONINE, TRYPTOPHAN, ALANINE, GLYCINE, ARGININE, PROLINE, SERINE, TYROSINE, SODIUM ACETATE, DIBASIC POTASSIUM PHOSPHATE, MAGNESIUM CHLORIDE, SODIUM CHLORIDE, CALCIUM CHLORIDE, DEXTROSE
201; 154; 159; 110; 165; 160; 132; 116; 50; 570; 283; 316; 187; 138; 11; 217; 261; 51; 112; 33; 10 INJECTION INTRAVENOUS at 09:01

## 2017-01-01 RX ADMIN — RAMELTEON 8 MG: 8 TABLET, FILM COATED ORAL at 09:01

## 2017-01-01 RX ADMIN — ASCORBIC ACID, VITAMIN A PALMITATE, CHOLECALCIFEROL, THIAMINE HYDROCHLORIDE, RIBOFLAVIN-5 PHOSPHATE SODIUM, PYRIDOXINE HYDROCHLORIDE, NIACINAMIDE, DEXPANTHENOL, ALPHA-TOCOPHEROL ACETATE, VITAMIN K1, FOLIC ACID, BIOTIN, CYANOCOBALAMIN: 200; 3300; 200; 6; 3.6; 6; 40; 15; 10; 150; 600; 60; 5 INJECTION, SOLUTION INTRAVENOUS at 03:01

## 2017-01-01 RX ADMIN — HYDRALAZINE HYDROCHLORIDE 50 MG: 25 TABLET, FILM COATED ORAL at 08:01

## 2017-01-01 RX ADMIN — LEUCINE, PHENYLALANINE, LYSINE, METHIONINE, ISOLEUCINE, VALINE, HISTIDINE, THREONINE, TRYPTOPHAN, ALANINE, GLYCINE, ARGININE, PROLINE, SERINE, TYROSINE, SODIUM ACETATE, DIBASIC POTASSIUM PHOSPHATE, MAGNESIUM CHLORIDE, SODIUM CHLORIDE, CALCIUM CHLORIDE, DEXTROSE
201; 154; 159; 110; 165; 160; 132; 116; 50; 570; 283; 316; 187; 138; 11; 217; 261; 51; 112; 33; 10 INJECTION INTRAVENOUS at 01:01

## 2017-01-01 RX ADMIN — METOPROLOL SUCCINATE 50 MG: 50 TABLET, EXTENDED RELEASE ORAL at 09:01

## 2017-01-01 RX ADMIN — FLUTICASONE FUROATE AND VILANTEROL TRIFENATATE 1 PUFF: 100; 25 POWDER RESPIRATORY (INHALATION) at 08:01

## 2017-01-01 RX ADMIN — FLUTICASONE FUROATE AND VILANTEROL TRIFENATATE 1 PUFF: 100; 25 POWDER RESPIRATORY (INHALATION) at 12:01

## 2017-01-01 RX ADMIN — METOPROLOL SUCCINATE 50 MG: 50 TABLET, EXTENDED RELEASE ORAL at 08:01

## 2017-01-01 RX ADMIN — LISINOPRIL 10 MG: 10 TABLET ORAL at 09:01

## 2017-01-01 RX ADMIN — FUROSEMIDE 40 MG: 40 TABLET ORAL at 12:01

## 2017-01-01 RX ADMIN — METOLAZONE 2.5 MG: 2.5 TABLET ORAL at 08:01

## 2017-01-01 RX ADMIN — FUROSEMIDE 40 MG: 40 TABLET ORAL at 08:01

## 2017-01-01 RX ADMIN — OXYBUTYNIN CHLORIDE 10 MG: 5 TABLET, EXTENDED RELEASE ORAL at 12:01

## 2017-01-01 RX ADMIN — TIZANIDINE 4 MG: 4 TABLET ORAL at 05:01

## 2017-01-01 RX ADMIN — LISINOPRIL 10 MG: 10 TABLET ORAL at 08:01

## 2017-01-01 RX ADMIN — OXYBUTYNIN CHLORIDE 10 MG: 5 TABLET, EXTENDED RELEASE ORAL at 09:01

## 2017-01-01 RX ADMIN — TAMSULOSIN HYDROCHLORIDE 0.4 MG: 0.4 CAPSULE ORAL at 12:01

## 2017-01-01 RX ADMIN — BENZONATATE 100 MG: 100 CAPSULE ORAL at 10:01

## 2017-01-01 RX ADMIN — SODIUM CHLORIDE 1000 ML: 0.9 INJECTION, SOLUTION INTRAVENOUS at 11:01

## 2017-01-01 RX ADMIN — METOLAZONE 2.5 MG: 2.5 TABLET ORAL at 12:01

## 2017-01-01 RX ADMIN — METOLAZONE 2.5 MG: 2.5 TABLET ORAL at 10:01

## 2017-01-01 RX ADMIN — HYDROCODONE BITARTRATE AND ACETAMINOPHEN 1 TABLET: 5; 325 TABLET ORAL at 02:01

## 2017-01-01 RX ADMIN — AMLODIPINE BESYLATE 5 MG: 5 TABLET ORAL at 08:01

## 2017-01-01 RX ADMIN — TIZANIDINE 4 MG: 4 TABLET ORAL at 08:01

## 2017-01-01 RX ADMIN — LISINOPRIL 10 MG: 10 TABLET ORAL at 12:01

## 2017-01-01 RX ADMIN — ASCORBIC ACID, VITAMIN A PALMITATE, CHOLECALCIFEROL, THIAMINE HYDROCHLORIDE, RIBOFLAVIN-5 PHOSPHATE SODIUM, PYRIDOXINE HYDROCHLORIDE, NIACINAMIDE, DEXPANTHENOL, ALPHA-TOCOPHEROL ACETATE, VITAMIN K1, FOLIC ACID, BIOTIN, CYANOCOBALAMIN: 200; 3300; 200; 6; 3.6; 6; 40; 15; 10; 150; 600; 60; 5 INJECTION, SOLUTION INTRAVENOUS at 08:01

## 2017-01-01 RX ADMIN — ASPIRIN 81 MG: 81 TABLET, COATED ORAL at 09:01

## 2017-01-01 RX ADMIN — ACETAMINOPHEN 650 MG: 650 SUPPOSITORY RECTAL at 05:01

## 2017-01-01 RX ADMIN — TIZANIDINE 4 MG: 4 TABLET ORAL at 09:01

## 2017-01-11 NOTE — TELEPHONE ENCOUNTER
MA called patient son back, inform him that Dr. Vinson need to fax his records to us. Per son his sister already called Dr. Vinson records and they supposed to fax his records to us. EDMUNDO

## 2017-01-11 NOTE — TELEPHONE ENCOUNTER
----- Message from Elizabeth Canchola sent at 1/11/2017 10:20 AM CST -----  Contact: Amarjit/son  [phone]  Referred by Dr Vinson called to schedule an second opinion  appt with Dr Whiteside for liver tumors. Please call back at [phone] pt dialyze MWF as soon as possible.

## 2017-01-18 NOTE — TELEPHONE ENCOUNTER
Took call directly from patient's daughter Dianna and informed her of the times of the scheduled test and the appointment with Dr Conrad. Pt with afp >10,000 with liver masses, dialysis.   Discussed his condition at length with the daughter.  Phoned Dr Weber's office to request the results of the patient's AFP and spoke to Lynsey.  Informed her of the appointment with Dr Conrad and also updated her on the condition of the patient as described by the patient's daughter Dianna.  She said she will fax over the results and let the  of what is going on.

## 2017-01-18 NOTE — TELEPHONE ENCOUNTER
----- Message from Teresa Rivers sent at 1/18/2017  8:40 AM CST -----  JPB- Patient called and would like to know if he can be seen before February. Patient states he is not doing well at all. He needs to be seen for Eval Of Liver Lesions. Please call patient back at [phone]. Thanks

## 2017-01-18 NOTE — TELEPHONE ENCOUNTER
Returned call and LVM on the number in the message, then called the other number and it rang and rang.  Ordered labs, ct scan and apptointment with Dr Conrad all on Tuesday, January 31 between his Monday and Wednesday HD treatments

## 2017-01-18 NOTE — TELEPHONE ENCOUNTER
Pt daughter called, chart rev. Pt with afp  >10,000 with liver masses, dialysis, . Requesting appt with Dr Whiteside. Appt made on 1/25 with Dr Whiteside. Referring Dr Fairchild called for labs.

## 2017-01-23 NOTE — TELEPHONE ENCOUNTER
----- Message from Dalila Mcdowell sent at 1/23/2017  9:29 AM CST -----  Contact: Dr Vital office [phone]  AMANDA-  office called requesting images and pathology reports on Mr Amarjit Huggins. Call back number is [phone]

## 2017-01-25 PROBLEM — D69.6 THROMBOCYTOPENIA: Status: ACTIVE | Noted: 2017-01-01

## 2017-01-25 PROBLEM — E46 MALNUTRITION COMPROMISING BODILY FUNCTION: Status: ACTIVE | Noted: 2017-01-01

## 2017-01-25 PROBLEM — R18.8 CIRRHOSIS OF LIVER WITH ASCITES: Status: ACTIVE | Noted: 2017-01-01

## 2017-01-25 PROBLEM — D49.0 LIVER TUMOR: Status: ACTIVE | Noted: 2017-01-01

## 2017-01-25 PROBLEM — N18.5 ANEMIA OF CHRONIC RENAL FAILURE, STAGE 5: Status: ACTIVE | Noted: 2017-01-01

## 2017-01-25 PROBLEM — K74.60 CIRRHOSIS OF LIVER WITH ASCITES: Status: ACTIVE | Noted: 2017-01-01

## 2017-01-25 PROBLEM — Z79.01 ANTICOAGULATED BY ANTICOAGULATION TREATMENT: Status: ACTIVE | Noted: 2017-01-01

## 2017-01-25 PROBLEM — D63.1 ANEMIA OF CHRONIC RENAL FAILURE, STAGE 5: Status: ACTIVE | Noted: 2017-01-01

## 2017-01-25 NOTE — PLAN OF CARE
Problem: Nutrition, Imbalanced: Inadequate Oral Intake (Adult)  Goal: Improved Oral Intake  Patient will demonstrate the desired outcomes by discharge/transition of care.  Outcome: Ongoing (interventions implemented as appropriate)  Recommendation/Intervention:   1. Initiate Dental Soft ADA diet with chopped meats when medically acceptable.   2. Pt would benefit from ST eval 2/2 daughter states pt with difficulty swallowing.     Goals:  Diet will be started within 24 hours  Nutrition Goal Status: new  Communication of RD Recs: reviewed with RN (Sudeep)

## 2017-01-25 NOTE — PROGRESS NOTES
"Placed heart monitor  8603 on patient due to HX of AFib. Patient with eyes closed at this time. No distress noted. o2 99% on 2 liters NC. Admit assessment complete. Patient with pitting edema to BLE +3. Bruising noted to BLE. Pressure ulcer to sacrum PTA laura. Right FA PIV. BUE wrapped in kerlix and ace bandage due to "weeping" on arms. Left FA AV graft for dialysis with + bruit +thrill. Patient with scant production of urine due to dialysis. Hands dry and dusky appearance. Patient skin color dusky. abd rounded and taut. Last BM on 1/24. Bedside commode placed per daughter request. Yellow socks on patient fall risk arm band on  Patient . Bed alarm on   "

## 2017-01-25 NOTE — PROGRESS NOTES
Informed Dr. Conrad of call from IR that since patient is on Plavix, they would prefer to delay the bx. 5 days. Dr. Conrad spoke with IR shortly after that conversation.

## 2017-01-25 NOTE — PLAN OF CARE
Problem: Patient Care Overview  Goal: Plan of Care Review  Outcome: Ongoing (interventions implemented as appropriate)  Pt's SpO2 98% on RA. No adverse reactions to aerosol tx or MDI. No respiratory distress noted. Continue to monitor SpO2.

## 2017-01-25 NOTE — PROGRESS NOTES
Arrived to room to assist patient to the BSC. Patient states that he no longer has the urge to use the BSC and that he has not had a BM. Asked patient to let us know if he has the urge again. New drawsheet was placed on the patient's bed per patient's request and patient was turned and adjusted in the bed.

## 2017-01-25 NOTE — PROGRESS NOTES
Received report. Introduced self to patient and assessed patient for pain, safety and nutritional needs. Patient appears anxious and fixated on water. Explained to patient that he was NPO but gave him a small amount of ice chips to sooth his throat.

## 2017-01-25 NOTE — PROGRESS NOTES
"Received patient from [facility] via [facility]. V/s per flow sheet. Patient drowsy on arrival . Awakens to touch. Patient irritated . Stating, "i have not slept in days". Daughter at bedside. Safety ensured.   "

## 2017-01-25 NOTE — PROGRESS NOTES
Arrived to room to give pain medication and 9 AM medications. Patient states that he has difficulty swallowing and that he can only take 2 medications at a time. Patient was given 2 pills at a time per his request without incident.

## 2017-01-25 NOTE — CONSULTS
After discussion with Dr. Conrad, since this is a specific mass, percutaneous biopsy will need to be performed.  Patient is asa & plavix.  Biopsy can be scheduled in 5 days after asa & Plavix held

## 2017-01-25 NOTE — PLAN OF CARE
Problem: Nutrition, Parenteral (Adult)  Goal: Signs and Symptoms of Listed Potential Problems Will be Absent, Minimized or Managed (Nutrition, Parenteral)  Signs and symptoms of listed potential problems will be absent, minimized or managed by discharge/transition of care (reference Nutrition, Parenteral (Adult) CPG).  Outcome: Ongoing (interventions implemented as appropriate)

## 2017-01-25 NOTE — PROGRESS NOTES
Spoke with Dr. Ball about patient's request for ice. Orders received to not give patient ice because a bx. may be performed today.

## 2017-01-25 NOTE — CONSULTS
Consult Note  [facility]    Consult Requested By: EPI Conrad MD  Reason for Consult: ESRD    SUBJECTIVE:     History of Present Illness: Hard of hearing  Patient is a 76 y.o. male with PMHx of HTN, CHF, DM , ESRD on HD M/W/F, ? Liver cirrhosis, COPD history of CAD s/p PCI on asa/plavix presents for a liver mass evaluation and possible biopsy. Patient was admitted to [facility] underwent HD Sunday, Monday, Tuesday because of volume overload. Patient was found to have a liver mass.    Past Medical History   Diagnosis Date    Coronary artery disease     Diabetes mellitus     Dialysis patient     Hypertension     Lung disease     Vertigo      Past Surgical History   Procedure Laterality Date    Coronary stent placement       Family History   Problem Relation Age of Onset    No Known Problems Mother     Diabetes Father     No Known Problems Sister     No Known Problems Brother     No Known Problems Maternal Aunt     Melanoma Neg Hx      Social History   Substance Use Topics    Smoking status: Former Smoker     Packs/day: 2.00     Years: 45.00     Types: Cigarettes     Quit date: 8/3/2000    Smokeless tobacco: Never Used    Alcohol use No       Review of patient's allergies indicates:   Allergen Reactions    Hytrin [terazosin] Shortness Of Breath    Crestor [rosuvastatin] Other (See Comments)     Muscle cramps    Vytorin 10-10 [ezetimibe-simvastatin] Other (See Comments)     Muscle cramps        Review of Systems:  As per HPI    OBJECTIVE:     Vital Signs (Most Recent)  Temp: 97.9 °F (36.6 °C) (01/25/17 0800)  Pulse: 92 (01/25/17 0941)  Resp: 18 (01/25/17 0941)  BP: (!) 107/59 (01/25/17 0800)  SpO2: 98 % (01/25/17 0941)    Vital Signs Range (Last 24H):  Temp:  [97.7 °F (36.5 °C)-99.3 °F (37.4 °C)]   Pulse:  [86-92]   Resp:  [18-20]   BP: (107-120)/(58-59)   SpO2:  [98 %-99 %]     Physical Exam:  Gen: cachexia  CVS: no murmurs  Lungs decrease breath sounds  Abdomen soft  +BS/ascites  Ext: + 2 pitting edema + anasarca    Laboratory:  CBC:   Recent Labs  Lab 01/25/17  0946   WBC 9.34   RBC 3.49*   HGB 9.5*   HCT 29.4*   PLT 77*   MCV 84   MCH 27.2   MCHC 32.3     BMP:   Recent Labs  Lab 01/25/17  0737   *   CL 97   CO2 19*   BUN 36*   CREATININE 4.5*   CALCIUM 8.4*   MG 1.6     CMP:   Recent Labs  Lab 01/25/17  0737   *   CALCIUM 8.4*   ALBUMIN 1.5*   PROT 5.5*   *   K 4.4   CO2 19*   CL 97   BUN 36*   CREATININE 4.5*   ALKPHOS 596*   ALT 30   AST 71*   BILITOT 1.1*     Coagulation:   Recent Labs  Lab 01/25/17  0737   INR 1.1   APTT 32.6*         ASSESSMENT/PLAN:     1- ESRD on HD  2- hypervolemia ? D/t CHF vs liver cirrhosis  3- liver mass  4- CAD s/p PCI on dual antiplatelet therapy  5- DM  6- COPD  7- severe protein calorie malnutrition       Plan:     -- will plan for HD session tomorrow x 3 hrs and UF goal 2-3 liters  -- recommend increasing lasix 120 mg p.o twice daily to increase UOP.  -- daily labs and weight    Maryse Bello MD  [facility] PGY-4

## 2017-01-25 NOTE — PLAN OF CARE
TN met with pt, son Amarjit WYATT    and daughter Dianna Alonzo   - daughter lives in [place];   son lives with pt.    HD MWF at [facility] with Dr. Isi chauhan #   [phone]  -- son transports   per son - pt is now immobile -- he is taking care of pt with great diffficulty.   son and daughter discussed NH plcmt;   they report they recently declined Hospice for pt as HD would be stopped and they aren't comfortable with that.      Gave pt's children a resource guide - they will let TN know if they are interested in NH plcmt   currently with [facility] - contact Gayle Stokes [phone].          01/25/17 1654   Discharge Assessment   Assessment Type Discharge Planning Assessment   Confirmed/corrected address and phone number on facesheet? Yes   Assessment information obtained from? Caregiver;Medical Record   Expected Length of Stay (days) 3   Communicated expected length of stay with patient/caregiver yes   Prior to hospitilization cognitive status: Unable to Assess   Prior to hospitalization functional status: Completely Dependent   Current cognitive status: Unable to Assess   Current Functional Status: Completely Dependent   Arrived From ([facility] )   Lives With child(rodo), adult  (son:  Meena Huggins  [phone];   daughter Dianna Alonzo  [phone] lives in [place] )   Is patient able to care for self after discharge? Yes   How many people do you have in your home that can help with your care after discharge? 1   Who are your caregiver(s) and their phone number(s)? (lives with son Amarjit)   Patient's perception of discharge disposition (TBD - home with HH vs NH plcmt vs. Hospice )   Readmission Within The Last 30 Days (transferred from [facility] )   Patient currently being followed by outpatient case management? No   Patient currently receives home health services? Yes  ([facility] - contact Romina   [phone] )   Does the patient currently use HME? Yes    Equipment Currently Used at Home bedside commode;wheelchair;walker, rolling   Do you have any problems affording any of your prescribed medications? No   Is the patient taking medications as prescribed? yes   Do you have any financial concerns preventing you from receiving the healthcare you need? No   Does the patient have transportation to healthcare appointments? Yes   On Dialysis? Yes   If yes, what is the name of the dialysis unit? ([facility]   Dr. Snowden   p #   [phone] )   Does the patient receive outpatient dialysis? Yes  (MWF  10:00 am  - son transports pts   )   Does the patient receive services at the [facility]madin Clinic? No   Are there any open cases? No   Discharge Plan A Home;Home with family;Home Health   Discharge Plan B Skilled Nursing Facility   Patient/Family In Agreement With Plan yes

## 2017-01-25 NOTE — PROGRESS NOTES
Changed order from Zolpidem to Ramelteon 8mg per P&T protocol based on pt age. It is shown to be a safer option for patients 65 years and older because there is a decrease in fall risks.   Shana Goetz, LeonD

## 2017-01-25 NOTE — PROGRESS NOTES
.Pharmacy New Medication Education    Patient accepted medication education.    Patient wants BEDSIDE DELIVERY. Pharmacy has been updated in the system.    Pharmacy educated patient on the following medications, using the teach-back method.   APAP  Albuterol  Duoneb  Amiodarone  Norvasc  Asa  Plavix  Benadryl  Lovenox  Breo  Lasix  Norco  Xalatan  Lisinopril  Antivert  Metolazone  Toprol  Zofran  Oxybutynin  Phenergan  Ramelteon  Aldactone  Flomax  tizanidine    Learners of pharmacy medication education included:  patient    Patient +/- learner response:  verbalize understanding    Patient +/- learner response:  verbalize understanding

## 2017-01-25 NOTE — H&P
"[facility]  History & Physical  General Surgery    SUBJECTIVE:     Chief Complaint/Reason for Admission: Liver Mass    History limited due to poor historian    History of Present Illness:  Patient is a 76 y.o. male with a complicated PMHx including CHF, ESRD on HD MWF, CAD with cardiac stent, DM, HTN, COPD who is a direct admit to have liver mass evaluated. For the past month, the patient has been having intermittent episodes of sob not quite relieved with his home medications. He has been identified to have a Liver mass seen on CT in the hospital at [place] and is waiting on appointment to be seen with NET clinic at [place]. Three days ago, patient developed sob and was taken to the hospital in [place] but workup for his liver mass was requested to be done at [facility] by family. Thus, patient was transferred. Currently, the patient is perseverating on water and will not answer any other questions.    PTA Medications   Medication Sig    albuterol (PROVENTIL) 2.5 mg /3 mL (0.083 %) nebulizer solution Take 3 mLs (2.5 mg total) by nebulization every 6 (six) hours as needed for Shortness of Breath.    albuterol 90 mcg/actuation inhaler 2 puffs every 4 hours as needed for cough, wheeze, or shortness of breath    amiodarone (PACERONE) 200 MG Tab Take 200 mg by mouth 2 (two) times daily.    amlodipine (NORVASC) 5 MG tablet Take 5 mg by mouth once daily.    ANORO ELLIPTA 62.5-25 mcg/actuation DsDv Take 62 mg by mouth once daily.    aspirin (ECOTRIN) 81 MG EC tablet Take 81 mg by mouth once daily.    BD INSULIN PEN NEEDLE UF SHORT 31 gauge x 5/16" Ndle 1 each 5 (five) times daily.    budesonide-formoterol 160-4.5 mcg (SYMBICORT) 160-4.5 mcg/actuation HFAA Inhale 2 puffs into the lungs every 12 (twelve) hours.    carvedilol (COREG) 3.125 MG tablet Take 3.125 mg by mouth 2 (two) times daily.    clopidogrel (PLAVIX) 75 mg tablet Take 75 mg by mouth once daily.    ethyl chloride 100% 100 % " spray Apply 100 sprays topically once daily.    furosemide (LASIX) 40 MG tablet Take 1 tablet (40 mg total) by mouth once daily.    hydrALAZINE (APRESOLINE) 50 MG tablet Take 50 mg by mouth 2 (two) times daily.    hydrocodone-acetaminophen 10-325mg (NORCO)  mg Tab     hydrocodone-acetaminophen 5-325mg (NORCO) 5-325 mg per tablet Take 5 tablets by mouth daily as needed.    ibuprofen (ADVIL,MOTRIN) 800 MG tablet     insulin aspart (NOVOLOG) 100 unit/mL injection Inject 6-10 Units into the skin 3 (three) times daily before meals.    insulin glargine (LANTUS) 100 unit/mL injection Inject 18 Units into the skin every morning.    latanoprost 0.005 % ophthalmic solution Place 0.005 drops into both eyes once daily.    lidocaine (LIDODERM) 5 % Place 5 patches onto the skin once daily.    lisinopril 10 MG tablet Take 10 mg by mouth once daily.    meclizine (ANTIVERT) 25 mg tablet Take 25 mg by mouth 3 (three) times daily as needed.    metolazone (ZAROXOLYN) 2.5 MG tablet Take 2.5 mg by mouth once daily.    metoprolol succinate (TOPROL-XL) 50 MG 24 hr tablet Take 50 mg by mouth once daily.    NOVOLOG FLEXPEN 100 unit/mL InPn pen Inject 100 mLs into the skin once daily.    ondansetron (ZOFRAN) 4 MG tablet Take 4 mg by mouth every 6 (six) hours as needed.    ondansetron (ZOFRAN-ODT) 4 MG TbDL TAKE 1/2 TAB EVERY 8 HOURS AS NEEDED FOR VOMITING.    oxybutynin (DITROPAN-XL) 10 MG 24 hr tablet     pitavastatin (LIVALO) 4 mg Tab Take 1 tablet by mouth once daily.    potassium chloride (MICRO-K) 10 MEQ CpSR     spironolactone (ALDACTONE) 25 MG tablet     tamsulosin (FLOMAX) 0.4 mg Cp24 Take 0.4 mg by mouth once daily.    terazosin (HYTRIN) 2 MG capsule     tizanidine (ZANAFLEX) 4 MG tablet Take 4 mg by mouth every 8 (eight) hours as needed.    zolpidem (AMBIEN) 10 mg Tab Take 5 mg by mouth nightly as needed.    zolpidem (AMBIEN) 5 MG Tab Take 5 mg by mouth nightly as needed.       Review of patient's  allergies indicates:   Allergen Reactions    Hytrin [terazosin] Shortness Of Breath    Crestor [rosuvastatin] Other (See Comments)     Muscle cramps    Vytorin 10-10 [ezetimibe-simvastatin] Other (See Comments)     Muscle cramps       Past Medical History   Diagnosis Date    Coronary artery disease     Diabetes mellitus     Dialysis patient     Hypertension     Lung disease     Vertigo      Past Surgical History   Procedure Laterality Date    Coronary stent placement       Family History   Problem Relation Age of Onset    No Known Problems Mother     Diabetes Father     No Known Problems Sister     No Known Problems Brother     No Known Problems Maternal Aunt     Melanoma Neg Hx      Social History   Substance Use Topics    Smoking status: Former Smoker     Packs/day: 2.00     Years: 45.00     Types: Cigarettes     Quit date: 8/3/2000    Smokeless tobacco: Never Used    Alcohol use No        Review of Systems:  Limited 2/2 to patient perseverating on water and not willing to answer other questions.     OBJECTIVE:     Vital Signs (Most Recent):  Temp: 97.7 °F (36.5 °C) (01/25/17 0400)  Pulse: 86 (01/25/17 0400)  Resp: 18 (01/25/17 0400)  BP: (!) 120/59 (01/25/17 0400)  SpO2: 99 % (01/25/17 0100)    Physical Exam:  Mild discomfort 2/2 agitation  Diminished breath sounds bilateral bases  RRR  Abdomen: soft L quadrants of abdomen, R quadrants with palpable mass felt starting at subcostal margin R flank region and extending down to RLQ and midline, NT, no rebound, no guarding  MSKL: pitting edema to bilateral upper arms with bandages wrapped to capture serous oozing, RLE with pitting edema, LLE with no edema, Vascular access site to L forearm    Laboratory:  CBC: pending  CMP: pending  Mg: pending  Phos: pending    Diagnostic Results:  CXR: pending    ASSESSMENT/PLAN:     Amarjit Huggins is a 77 yo M admitted for liver mass and sob  -CXR for sob and diminished breath sounds  -CT Abdomen Pelvis with IV and  PO contrast to identify liver mass  -f/u labs  -Nephrology consult   -DM - Insulin Sliding Scale  -Admit to inpatient    Sudeep Ball MD  PGY-2

## 2017-01-25 NOTE — CONSULTS
"[facility]  Adult Nutrition  Consult Note    SUMMARY     Recommendations    Recommendation/Intervention:   1. Initiate Dental Soft ADA diet with chopped meats when medically acceptable.   2. Pt would benefit from ST eval 2/2 daughter states pt with difficulty swallowing.    Goals:   Diet will be started within 24 hours  Nutrition Goal Status: new  Communication of RD Recs: reviewed with RN (Sudeep)    Continuum of Care Plan  Referral to Outpatient Services:  (d/c needs to be determined)    Reason for Assessment  Reason for Assessment: nurse/nurse practitioner consult (per nursing assessment)  Relevent Medical History: 75 yo male admitted with liver mass. PMH + for HTN, DM, CAD, vertigo, lung disease, HD.      General Information Comments: Pt currently NPO for biopsy today. Pt with recent poor appetite. Daughter reports pt eats soft foods at home 2/2 triuble swallowing. She states pt always "feels full".     Nutrition Prescription Ordered  Current Diet Order: NPO    Nutrition Risk Screen  Nutrition Risk Screen: unintentional loss of 10 lbs or more in the past 2 mos    Nutrition/Diet History  Patient Reported Diet/Restrictions/Preferences:  (soft foods)  Food Preferences: no religioud or cultural food prefs identified  Factors Affecting Nutritional Intake: decreased appetite, difficulty/impaired swallowing, early satiety    Labs/Tests/Procedures/Meds  Pertinent Labs Reviewed: reviewed  Pertinent Labs Comments: Na 130L, BUN 36H, Crea 4.5H, Glu 137H, Ca 8.4L, Alb 1.5L  Pertinent Medications Reviewed: reviewed  Pertinent Medications Comments: aspirin, Lasix, lisinopril    Physical Findings  Overall Physical Appearance: overweight  Tubes:  (none)  Oral/Mouth Cavity: WDL  Skin:  (Matti 14-stg II coccyx)    Anthropometrics  Height (inches): 70.98 in  Weight Method: Stated  Weight (kg): 86.1 kg  Ideal Body Weight (IBW), Male: 171.88 lb  % Ideal Body Weight, Male (lb): 110.44 lb  BMI (kg/m2): 26.49  BMI " Grade: 25 - 29.9 - overweight     Estimated/Assessed Needs  Weight Used For Calorie Calculations: 86.1 kg (189 lb 13.1 oz)   Height (cm): 180.3 cm  Energy Need Method: Blanco-St Jeor (2110 (MSJ x 1.3))  RMR (Blanco-St. Jeor Equation): 1623.01  Weight Used For Protein Calculations: 86.1 kg (189 lb 13.1 oz)  Protein Requirements: 86g (1.0 g/kg)    Malnutrition (Undernutrition) Diagnosis  % Meal Intake: NPO     Nutrition Diagnosis  Nutrition Problem: Inadequate energy intake  Etiology/Related To: dx  Nutrition Diagnosis Signs/Symptoms As Evidenced By: pt currently NPO  Nutrition Diagnosis Status: New    Monitor and Evaluation  Food and Nutrient Intake: food and beverage intake  Food and Nutrient Adminstration: diet order  Physical Activity and Function: nutrition-related ADLs and IADLs  Anthropometric Measurements: weight  Biochemical Data, Medical Tests and Procedures: electrolyte and renal panel  Nutrition-Focused Physical Findings: overall appearance    Nutrition Risk  Level of Risk: moderate    Nutrition Follow-Up  RD Follow-up?: Yes

## 2017-01-25 NOTE — PROGRESS NOTES
Informed Dr abebe about arrival of patient. Patient with no orders. Md with no orders at this time. Keep NPO until morning.

## 2017-01-26 PROBLEM — E43 EDEMA DUE TO MALNUTRITION: Status: ACTIVE | Noted: 2017-01-01

## 2017-01-26 NOTE — PHYSICIAN QUERY
"PT Name: Amarjit Huggins Jr.  MR #: [id number]  Physician Query Form - Heart  Condition Clarification   Reviewer  Ext [phone] [place]    This form is a permanent document in the medical record.     Query Date: January 26, 2017  By submitting this query, we are merely seeking further clarification of documentation. Please utilize your independent clinical judgment when addressing the question(s) below.  (The Medical record reflects the following:)   Indicators     Supporting Clinical Findings Location in Medical Record    BNP =      EF =     X CXR findings: There is a mild amount of bilateral perihilar and basilar opacity right greater than left concerning for infection versus edema.  There is probable mild right sided pleural fluid.  There is no pneumothorax.  The cardiac silhouette appears prominent.  There is calcification of the aorta.  The osseous structures demonstrate degenerative change. CXR 1/25   X "Ascites" documented Abdomen soft +BS/ascites Nephrology note 1/25   X "SOB" or "CAMP" documented intermittent episodes of sob not quite relieved with his home medications H&P 1/25    "Hypoxia" documented     X CHF, HFpEF documented PMHx including CHF H&P 1/25   X "Edema" documented pitting edema to bilateral upper arms with bandages wrapped to capture serous oozing, RLE with pitting edema    Ext: + 2 pitting edema + anasarca H&P 1/25            Nephrology note 1/25   X Diuretics/Meds metoprolol succinate (TOPROL-XL) 24 hr tablet 50 mg    furosemide tablet 40 mg    spironolactone tablet 25 mg MAR   X Treatment: recommend increasing lasix 120 mg p.o twice daily to increase UOP Nephrology note 1/25    Other:        Provider, please specify diagnosis or diagnoses associated with above clinical findings.    [  ] Acute Systolic Heart Failure ( EF < 40)*  [  ] Acute on Chronic Systolic Heart Failure ( EF < 40)*  [  ] Chronic Systolic Heart Failure ( EF < 40)*  [  ] Acute Diastolic Heart Failure ( EF > 40)*  [  ] Acute " on Chronic Diastolic Heart Failure( EF > 40)*  [  ] Chronic Diastolic Heart Failure ( EF > 40)*  [  ] Acute Combined Systolic and Diastolic Heart Failure  [  ] Acute on Chronic Combined Systolic and Diastolic Heart Failure  [ x ] Chronic Combined Systolic and Diastolic Heart Failure  *American Heart Association  [  ] Other Cardiac Diagnosis (Specify) ___________________________________  [  ] Clinically undetermined    Please document in your progress notes daily for the duration of treatment, until resolved, and include in your discharge summary.

## 2017-01-26 NOTE — PLAN OF CARE
Problem: Patient Care Overview  Goal: Plan of Care Review  Outcome: Ongoing (interventions implemented as appropriate)  Pt is awake, alert and oriented. Pt complains of continuous pain, medication given. Pt given all meds per MD order. Pt last blood sugar was 159. Pt continues to refuse CHLOÉ's and SCD's. Mepilex applied to coccyx. Pt has not voided this shift. Pt still outstanding urine sample. Pt tolerating diet well.

## 2017-01-26 NOTE — PROGRESS NOTES
Occupational Therapy   Evaluation Attempt    Daggett SALLIE Huggins Jr.   MRN: [id number]       Pt unavailable at this time- in HD; will attempt again as available.     Ne Palomo, OT  1/26/2017

## 2017-01-26 NOTE — PROGRESS NOTES
Occupational Therapy   Evaluation Attempt      Amarjit RIZO Joseluis Peñaloza.   MRN: [id number]       Pt unavailable this PM- being taken to ultrasound by transport at this time. Will attempt again as available.     Ne Palomo, OT  1/26/2017

## 2017-01-26 NOTE — PT/OT/SLP PROGRESS
Physical Therapy      Amarjit SALLIE Joseluis Peñaloza.  MRN: [id number]    Patient not seen today secondary to HD in AM and US in PM. Will follow-up 01/27/2016.    Chayo Aviles, PT

## 2017-01-26 NOTE — PROGRESS NOTES
TN spoke with pt's daughter Dianna --   ok to consult all  area Nursing Homes --   1st choice::   [facility].        MARTIN Zhang is working with this placement.     Family advised TN yesterday that pt is too much for son to take care of at home now.

## 2017-01-26 NOTE — PROGRESS NOTES
"Progress Note  General Surgery    Admit Date: 1/25/2017  Post-operative Day:    Hospital Day: 2    SUBJECTIVE:     NAEO. Patient still perseverating on ice. Patient was given a clear liquid diet last night with water and ice given throughout the night per nurse. Patient does not answer any other questions regarding chest pain, sob, whether able to urinate, pass bowel movement, nausea, vomiting, but instead, will request more ice. Stating "I'm going to die if I don't get anything to drink".       Scheduled Meds:   albuterol sulfate  2.5 mg Nebulization Q4H    amiodarone  200 mg Oral BID    amlodipine  5 mg Oral Daily    enoxaparin  30 mg Subcutaneous Daily    fluticasone-vilanterol  1 puff Inhalation Daily    furosemide  40 mg Oral Daily    hydrALAZINE  50 mg Oral BID    latanoprost  1 drop Both Eyes QHS    lisinopril  10 mg Oral Daily    metOLazone  2.5 mg Oral Daily    metoprolol succinate  50 mg Oral Daily    oxybutynin  10 mg Oral Daily    sodium chloride 0.9%  3 mL Intravenous Q8H    spironolactone  25 mg Oral Daily    tamsulosin  0.4 mg Oral Daily     Continuous Infusions:   Amino acid 2.75% - dextrose 10% (CLINIMIX-E) solution with additives ( 1L provides 27.5 gm AA, 100 gm CHO (340 kcal/L dextrose), Na 35, K 30, Mg 5, Ca 4.5, Acetate 51, Cl 39, Phos 15) 50 mL/hr at 01/25/17 1508     PRN Meds:acetaminophen, albumin human 25%, albuterol, dextrose 50%, dextrose 50%, diphenhydrAMINE, glucagon (human recombinant), hydrocodone-acetaminophen 5-325mg, flu vacc [id number] 65yr up(PF), insulin aspart, meclizine, ondansetron, promethazine (PHENERGAN) IVPB, ramelteon, tizanidine    Review of patient's allergies indicates:   Allergen Reactions    Hytrin [terazosin] Shortness Of Breath    Crestor [rosuvastatin] Other (See Comments)     Muscle cramps    Vytorin 10-10 [ezetimibe-simvastatin] Other (See Comments)     Muscle cramps       OBJECTIVE:     Vital Signs (Most Recent)  Temp: 97.4 °F (36.3 °C) " (01/26/17 0800)  Pulse: 100 (01/26/17 0800)  Resp: 18 (01/26/17 0800)  BP: (!) 104/57 (01/26/17 0800)  SpO2: 99 % (01/26/17 0500)    Vital Signs Range (Last 24H):  Temp:  [96.2 °F (35.7 °C)-98.6 °F (37 °C)]   Pulse:  []   Resp:  [16-20]   BP: ()/(56-59)   SpO2:  [98 %-99 %]     I & O (Last 24H):  Intake/Output Summary (Last 24 hours) at 01/26/17 0933  Last data filed at 01/26/17 0642   Gross per 24 hour   Intake              720 ml   Output                0 ml   Net              720 ml     Physical Exam:  Mild discomfort 2/2 anxiety about wanting ice  Breaths sounds improved to lungs  RRR  Abdomen: R quadrant with palpable mass, soft to L quadrants,     Laboratory:  Lab Results   Component Value Date    WBC 6.82 01/26/2017    HGB 10.1 (L) 01/26/2017    HCT 31.2 (L) 01/26/2017    MCV 86 01/26/2017    PLT 77 (L) 01/26/2017     CMP  Sodium   Date Value Ref Range Status   01/26/2017 128 (L) 136 - 145 mmol/L Final     Potassium   Date Value Ref Range Status   01/26/2017 4.2 3.5 - 5.1 mmol/L Final     Chloride   Date Value Ref Range Status   01/26/2017 92 (L) 95 - 110 mmol/L Final     CO2   Date Value Ref Range Status   01/26/2017 23 23 - 29 mmol/L Final     Glucose   Date Value Ref Range Status   01/26/2017 151 (H) 70 - 110 mg/dL Final     BUN, Bld   Date Value Ref Range Status   01/26/2017 46 (H) 8 - 23 mg/dL Final     Creatinine   Date Value Ref Range Status   01/26/2017 5.3 (H) 0.5 - 1.4 mg/dL Final     Calcium   Date Value Ref Range Status   01/26/2017 8.4 (L) 8.7 - 10.5 mg/dL Final     Total Protein   Date Value Ref Range Status   01/26/2017 5.6 (L) 6.0 - 8.4 g/dL Final     Albumin   Date Value Ref Range Status   01/26/2017 1.5 (L) 3.5 - 5.2 g/dL Final     Total Bilirubin   Date Value Ref Range Status   01/26/2017 1.0 0.1 - 1.0 mg/dL Final     Comment:     For infants and newborns, interpretation of results should be based  on gestational age, weight and in agreement with  clinical  observations.  Premature Infant recommended reference ranges:  Up to 24 hours.............<8.0 mg/dL  Up to 48 hours............<12.0 mg/dL  3-5 days..................<15.0 mg/dL  6-29 days.................<15.0 mg/dL       Alkaline Phosphatase   Date Value Ref Range Status   01/26/2017 531 (H) 55 - 135 U/L Final     AST   Date Value Ref Range Status   01/26/2017 50 (H) 10 - 40 U/L Final     ALT   Date Value Ref Range Status   01/26/2017 25 10 - 44 U/L Final     Anion Gap   Date Value Ref Range Status   01/26/2017 13 8 - 16 mmol/L Final     eGFR if    Date Value Ref Range Status   01/26/2017 11 (A) >60 mL/min/1.73 m^2 Final     eGFR if non    Date Value Ref Range Status   01/26/2017 10 (A) >60 mL/min/1.73 m^2 Final     Comment:     Calculation used to obtain the estimated glomerular filtration  rate (eGFR) is the CKD-EPI equation. Since race is unknown   in our information system, the eGFR values for   -American and Non--American patients are given   for each creatinine result.       CEA: 5    AFP: pending    Diagnostic Results:  CXR: Bilateral opacification in perihilar and basilar region R>L.     ASSESSMENT/PLAN:     Amarjit Huggins is a 75 yo M with Liver mass and ESRD on HD MWF admitted for direct transfer with sob and request for liver biopsy  -Per IR, unable to perform Liver Biopsy 2/2 patient recently on Plavix and ASA, next available time would be on 1/30/17  -Stopped ASA and Plavix  -Consult Cardiology  -Follow recs for nephrology - HD today  -CXR with pleural effusions bilaterally - continue O2 and encourage IS use, may relieve with Dialysis  -Clear liquid diet  -Speech therapy consult for swallowing  -Follow up AFP levels  -Get US of Liver to identify mass    Sudeep Ball MD  PGY-2

## 2017-01-26 NOTE — PROGRESS NOTES
consulted to assist with discharge planning, need nursing home placement.  Transition navigator, Yumiko Sinha spoke with daughter regarding preference for nursing facilities.  Per daughter preference  sent referral to first [facility], and other nursing facilities in the [facility] ([facility], [facility], [facility], [facility], and [facility]).

## 2017-01-26 NOTE — PROGRESS NOTES
Progress Note  [facility]    Admit Date: 1/25/2017   LOS: 1 day     SUBJECTIVE:     Follow-up For:  ESRD    Review of Systems:  c/o generalized pain, requesting something stronger. he also wants regular food.    OBJECTIVE:     Vital Signs (Most Recent)  Temp: 97.4 °F (36.3 °C) (01/26/17 0800)  Pulse: 100 (01/26/17 0800)  Resp: 18 (01/26/17 0800)  BP: (!) 104/57 (01/26/17 0800)  SpO2: 99 % (01/26/17 0500)    Vital Signs Range (Last 24H):  Temp:  [96.2 °F (35.7 °C)-98.6 °F (37 °C)]   Pulse:  []   Resp:  [16-20]   BP: ()/(56-59)   SpO2:  [98 %-99 %]        Physical Exam:   Gen: cachexia  CVS: no murmurs  Lungs decrease breath sounds  Abdomen soft +BS/ascites  Ext: + 2 pitting edema + anasarca    Laboratory:  CBC:   Recent Labs  Lab 01/26/17 0443   WBC 6.82   RBC 3.64*   HGB 10.1*   HCT 31.2*   PLT 77*   MCV 86   MCH 27.7   MCHC 32.4     BMP:   Recent Labs  Lab 01/26/17 0443   *   CL 92*   CO2 23   BUN 46*   CREATININE 5.3*   CALCIUM 8.4*   MG 1.7     CMP:   Recent Labs  Lab 01/26/17 0443   *   CALCIUM 8.4*   ALBUMIN 1.5*   PROT 5.6*   *   K 4.2   CO2 23   CL 92*   BUN 46*   CREATININE 5.3*   ALKPHOS 531*   ALT 25   AST 50*   BILITOT 1.0     LFTs:   Recent Labs  Lab 01/26/17 0443   ALT 25   AST 50*   ALKPHOS 531*   BILITOT 1.0   PROT 5.6*   ALBUMIN 1.5*         ASSESSMENT/PLAN:     Active Hospital Problems    Diagnosis  POA    *Liver tumor [D49.0]  Yes    Edema due to malnutrition [E43]  Unknown    Malnutrition compromising bodily function [E46]  Yes    Cirrhosis of liver with ascites [K74.60]  Yes    Thrombocytopenia [D69.6]  Yes    Anemia of chronic renal failure, stage 5 [N18.5, D63.1]  Yes    Anticoagulated by anticoagulation treatment [Z79.01]  Not Applicable    ESRD (end stage renal disease) [N18.6]  Yes    Aspiration into airway [T17.908A]  Yes    Edema [R60.9]  Yes    Chronic obstructive pulmonary disease [J44.9]  Yes    Pleural effusion [J90]  Yes       Resolved Hospital Problems    Diagnosis Date Resolved POA   No resolved problems to display.     1- ESRD on HD  2- hyponatremia d/t hypervolemia ? d/t CHF vs liver cirrhosis  3- liver mass ? malignancy  4- CAD s/p PCI on dual antiplatelet therapy  5- DM  6- COPD  7- severe protein calorie malnutrition         Plan:      -- will plan for HD session today x 3 hrs and UF goal 2-3 liters  -- will plan for UF in am  -- recommend increasing lasix 120 mg p.o twice daily to increase UOP.  -- c/w metolazone 2.5 mg p.o qdaily  -- daily labs and weight  -- consult nutrition.  -- would avoid PICC line insertion.      Maryse Bello MD  [facility] PGY-4

## 2017-01-26 NOTE — PLAN OF CARE
Problem: Patient Care Overview  Goal: Plan of Care Review  Outcome: Ongoing (interventions implemented as appropriate)  Pt's SpO2 98% on 1 lpm NC. No adverse reactions to aerosol tx. No respiratory distress noted. Continue to monitor SpO2.

## 2017-01-26 NOTE — PHYSICIAN QUERY
"PT Name: Amarjit Huggins Jr.  MR #: [id number]  Physician Query Form -Integumentary System Clarification     Reviewer  Ext [phone] [place]    This form is a permanent document in the medical record.     Query Date: January 26, 2017  By submitting this query, we are merely seeking further clarification of documentation to reflect the severity of illness of your patient. Please utilize your independent clinical judgment when addressing the question(s) below.    The Medical record reflects the following:   Indicator   Supporting Clinical Findings Location in Medical Record    "Redness" documented      "Decubitus" documented     X "Ulcer" documented Pressure Ulcer coccyx Stage II Flowsheet 1/25    Wound care consult      Medication:      Treatment:      Other:      National Pressure Ulcer Advisory Panel (2007) Pressure Ulcer Definitions & Stages  Stage I:Intact skin with non-blanchable redness of a localized area usually over a bony prominence.  Stage II:Partial thickness loss of dermis presenting as a shallow open ulcer with a red pink wound bed, without slough.  Stage III:Full thickness tissue loss. Subcutaneous fat may be visible but bone, tendon or muscle is not exposed. Slough may be present but does not obscure the depth of tissue loss. May include undermining and tunneling.  Stage IV:Full thickness tissue loss with exposed bone, tendon or muscle. slough or eschar may be present on some parts of the wound bed. Often include undermining and tunneling.   Unstageable:Full thickness tissue loss in which the base of the wound, the true depth, and therefore stage, cannot be determined.   Deep Tissue Injury: Purple or maroon localized area of discolored intact skin or blood-filled blister due to damage of underlying soft tissue from pressure and/or shear.     Doctor, Please specify the diagnosis or diagnoses associated with above clinical findings.    [ ] Decubitus (Pressure) Ulcer (Specify site, stage and Present on " Admission (POA) status)  Site    Stage (see definitions)  POA      [  ] Ankle  [  ] Right  [  ] Left   [  ]Yes  [  ] No  [  ] Undeterminable    [  ] Buttock  [  ] Right  [  ] Left   [  ]Yes  [  ] No  [  ] Undeterminable    [  ] Coccyx  [  ] Right  [  ] Left   [  ]Yes  [  ] No  [  ] Undeterminable    [  ] Elbow   [  ] Right  [  ] Left   [  ]Yes  [  ] No  [  ] Undeterminable    [  ] Head     [  ]Yes  [  ] No  [  ] Undeterminable    [  ] Heel  [  ] Right  [  ] Left   [  ]Yes  [  ] No  [  ] Undeterminable    [  ] Hip  [  ] Right  [  ] Left   [  ]Yes  [  ] No  [  ] Undeterminable    [  ] Sacrum      [  ]Yes  [  ] No  [  ] Undeterminable    [  ] Shoulder blade  [  ] Right  [  ] Left   [  ]Yes  [  ] No  [  ] Undeterminable    [  ] Back  [  ] Right   [  ] Left  [  ] Upper   [  ] Low   [  ]Yes  [  ] No  [  ] Undeterminable    [ x ] Other site (specify)   arms  [  ]Yes  [  ] No  [  ] Undeterminable                                                                                                          [x] Neuropathy   [  ] PVD        [  ] Unspecified    [  ] Other/Specify   [  ]Skin ulcer secondary to (Specify)  [  ] Atherosclerotic vascular disease [  ] PVD   [  ] Unspecified  [  ] Other/Specify     [  ] Abscess (Specify site and organism if known)     [  ] Cellulitis (Specify site and organism if known)     [  ] Other (Specify)   [  ] Unable to determine

## 2017-01-26 NOTE — PROGRESS NOTES
[facility]  Adult Nutrition  Consult Note    SUMMARY     Recommendations    Recommendation/Intervention:   1. Rec change PPN: clinimex 4.25/10 @ 80 ml/hr + IVFE to better meet EEN       -monitor TG weekly with lipid infusions  2. SLP exam  3. Advance diet per SLP to  2200 ADA/Renal  4. RD to f/u with interview and monitor  Goals: Pt. To meet >85% EEN  Nutrition Goal Status: new  Communication of RD Recs: reviewed with RN    Continuum of Care Plan    Referral to Outpatient Services:  (d/c needs to be determined)    Reason for Assessment    Reason for Assessment: RD follow-up     Relevent Medical History: 77 yo male admitted with liver mass. PMH + for HTN, DM, CAD, vertigo, lung disease, HD.       General Information Comments: PPN running. No lipids. Diet: CL. Pt. out of room    Nutrition Prescription Ordered    Current Diet Order: NPO       Evaluation of Received Nutrients/Fluid Intake     Parenteral Calories (kcal): 540  Parenteral Protein (gm): 33  Parenteral Fluid (mL): 1200      Energy Calories Required: not meeting needs      Protein Required: meeting needs   Fluid Required:  (per md)      Nutrition Risk Screen     Nutrition Risk Screen: unintentional loss of 10 lbs or more in the past 2 mos    Nutrition/Diet History    Patient Reported Diet/Restrictions/Preferences:  (soft foods)     Food Preferences: no religioud or cultural food prefs identified      Factors Affecting Nutritional Intake: decreased appetite, difficulty/impaired swallowing, early satiety     Labs/Tests/Procedures/Meds     Pertinent Labs Reviewed: reviewed  Pertinent Labs Comments: Na 128; cl 92; BUN 46; Cr 5.3; alb 1.5; Phos 4.7  Pertinent Medications Reviewed: reviewed  Pertinent Medications Comments: aspirin, Lasix, lisinopril    Physical Findings    Overall Physical Appearance: overweight  Tubes:  (none)  Oral/Mouth Cavity: WDL  Skin:  (Matti 14-stg II coccyx)    Anthropometrics     Height (inches): 70.98 in  Weight  Method: Stated  Weight (kg): 86.1 kg  Ideal Body Weight (IBW), Male: 171.88 lb     % Ideal Body Weight, Male (lb): 110.44 lb     BMI (kg/m2): 26.49  BMI Grade: 25 - 29.9 - overweight        Estimated/Assessed Needs    Weight Used For Calorie Calculations: 86.1 kg (189 lb 13.1 oz)   Height (cm): 180.3 cm     Energy Need Method: Sevcon-Leveror (2110 (MSJ x 1.3))     RMR (Sevcon-St. Jeor Equation): 1623.01      Weight Used For Protein Calculations: 86.1 kg (189 lb 13.1 oz)  Protein Requirements: 86g (1.0 g/kg)    Malnutrition (Undernutrition) Diagnosis    % Intake of Estimated Energy Needs: 25 - 50%  % Meal Intake: NPO     Nutrition Diagnosis    Nutrition Problem: Inadequate energy intake  Etiology/Related To: dx  Nutrition Diagnosis Signs/Symptoms As Evidenced By: pt currently NPO  Nutrition Diagnosis Status: Continues    Monitor and Evaluation    Food and Nutrient Intake: food and beverage intake  Food and Nutrient Adminstration: diet order     Physical Activity and Function: nutrition-related ADLs and IADLs  Anthropometric Measurements: weight  Biochemical Data, Medical Tests and Procedures: electrolyte and renal panel  Nutrition-Focused Physical Findings: overall appearance    Nutrition Risk    Level of Risk: high (2 x week)    Nutrition Follow-Up    RD Follow-up?: Yes

## 2017-01-27 NOTE — CONSULTS
Consults   Requested to start peripheral IV   - L arm unavailable due to dialysis shunt   - R forearm covered in bandages except for dorsal hand (no vein seen there; skin very thickened & pathologic)   - pt not willing to be placed in Trendelenburg position to look for external jugular IV      Only central line options that do not usually require Trendelenburg position are PICC & femoral   - PICC not advisable in dialysis patients   - femoral has increased infection risk (& maybe thrombosis)

## 2017-01-27 NOTE — PROGRESS NOTES
Progress Note  [facility]    Admit Date: 1/25/2017   LOS: 2 days     SUBJECTIVE:     Follow-up For:  ESRD    Review of Systems:  c/o generalized pain, requesting something stronger. he also wants regular food.    OBJECTIVE:     Vital Signs (Most Recent)  Temp: 97.1 °F (36.2 °C) (01/27/17 0800)  Pulse: 98 (01/27/17 0921)  Resp: 17 (01/27/17 0921)  BP: 102/63 (01/27/17 0800)  SpO2: 99 % (01/27/17 0921)    Vital Signs Range (Last 24H):  Temp:  [97.1 °F (36.2 °C)-98.3 °F (36.8 °C)]   Pulse:  []   Resp:  [16-21]   BP: ()/(50-63)   SpO2:  [95 %-100 %]        Physical Exam:   Gen: cachexia  CVS: no murmurs  Lungs decrease breath sounds  Abdomen soft +BS/ascites  Ext: + 2 pitting edema + anasarca    Laboratory:  CBC:     Recent Labs  Lab 01/26/17 0443   WBC 6.82   RBC 3.64*   HGB 10.1*   HCT 31.2*   PLT 77*   MCV 86   MCH 27.7   MCHC 32.4     BMP:     Recent Labs  Lab 01/26/17 0443   *   CL 92*   CO2 23   BUN 46*   CREATININE 5.3*   CALCIUM 8.4*   MG 1.7     CMP:     Recent Labs  Lab 01/26/17 0443   *   CALCIUM 8.4*   ALBUMIN 1.5*   PROT 5.6*   *   K 4.2   CO2 23   CL 92*   BUN 46*   CREATININE 5.3*   ALKPHOS 531*   ALT 25   AST 50*   BILITOT 1.0     LFTs:     Recent Labs  Lab 01/26/17 0443   ALT 25   AST 50*   ALKPHOS 531*   BILITOT 1.0   PROT 5.6*   ALBUMIN 1.5*         ASSESSMENT/PLAN:     Active Hospital Problems    Diagnosis  POA    *Liver tumor [D49.0]  Yes    Edema due to malnutrition [E43]  Yes    Malnutrition compromising bodily function [E46]  Yes    Cirrhosis of liver with ascites [K74.60]  Yes    Thrombocytopenia [D69.6]  Yes    Anemia of chronic renal failure, stage 5 [N18.5, D63.1]  Yes    Anticoagulated by anticoagulation treatment [Z79.01]  Not Applicable    ESRD (end stage renal disease) [N18.6]  Yes    Aspiration into airway [T17.908A]  Yes    Edema [R60.9]  Yes    Chronic obstructive pulmonary disease [J44.9]  Yes    Pleural effusion [J90]  Yes       Resolved Hospital Problems    Diagnosis Date Resolved POA   No resolved problems to display.     1- ESRD on HD  2- hyponatremia d/t hypervolemia ? d/t CHF vs liver cirrhosis  3- liver mass ? malignancy  4- CAD s/p PCI on dual antiplatelet therapy  5- DM  6- COPD  7- severe protein calorie malnutrition   8- hypotension during HD probably d/t intravascular depletion      Plan:      -- will plan for UF session today x 3 hrs and UF goal 2-3 liters  -- HD on monday  -- could use albumin 25% IV during HD if MAP <60  -- recommend increasing lasix 120 mg p.o twice daily to increase UOP.  -- c/w metolazone 2.5 mg p.o qdaily  -- daily labs and weight  -- consult nutrition.  -- would avoid PICC line insertion.      Maryse Bello MD  [facility] PGY-4

## 2017-01-27 NOTE — PROGRESS NOTES
received phone call from long term access services presentative, stating patient does not need a level of care eligibility tool today (LOCET).  Mitzy Aguilar completed to LOCET on Monday.   informed to follow normal protocol to receive 142 form (nursiing home admission approval).  Level one pasrr screen and determination form placed in patient's blue chart for physician to sign.

## 2017-01-27 NOTE — PLAN OF CARE
Problem: Patient Care Overview  Goal: Plan of Care Review  Outcome: Ongoing (interventions implemented as appropriate)  Bed alarm activated and audible. Bed in low position. Side rails up x2. Call light within reach. Pt safety maintained. Educated pt to call for any assistance or needs. Pt verbalized understanding. Pt c/o pain to the back, rating 10/10 administered PRN pain medication per MD order. Dr Ball present on unit last night. Informed MD of pt refusing anesthesia to place any lines to the neck or subclavian. MD spoke with pt. Pt then states he is willing to have anesthesia attempt to place IV access. Dr. Magallanes notified of pt willing to have an attempt of IV access. Dr Magallanes placed RIJ TLC. CXR done and Dr Magallanes ordered that TLC was ok to use. TPN initiated at 50cc/hr. Tele monitor #[id number] in use, sinus rhythm noted with HR ranging in 70s-100. No ectopy noted. Will continue to monitor.    Problem: Fall Risk (Adult)  Goal: Identify Related Risk Factors and Signs and Symptoms  Related risk factors and signs and symptoms are identified upon initiation of Human Response Clinical Practice Guideline (CPG)   Outcome: Ongoing (interventions implemented as appropriate)                Goal: Absence of Falls  Patient will demonstrate the desired outcomes by discharge/transition of care.   Outcome: Ongoing (interventions implemented as appropriate)                  Problem: Nutrition, Imbalanced: Inadequate Oral Intake (Adult)  Goal: Identify Related Risk Factors and Signs and Symptoms  Related risk factors and signs and symptoms are identified upon initiation of Human Response Clinical Practice Guideline (CPG)   Outcome: Ongoing (interventions implemented as appropriate)                  Problem: Nutrition, Parenteral (Adult)  Goal: Signs and Symptoms of Listed Potential Problems Will be Absent, Minimized or Managed (Nutrition, Parenteral)  Signs and symptoms of listed potential problems will be absent,  minimized or managed by discharge/transition of care (reference Nutrition, Parenteral (Adult) CPG).   Outcome: Ongoing (interventions implemented as appropriate)

## 2017-01-27 NOTE — PROGRESS NOTES
Progress Note  General Surgery    Admit Date: 1/25/2017  Post-operative Day:    Hospital Day: 3    SUBJECTIVE:     STEFANOEO. Patient had central line placed for TPN use, which took some negotiating with the patient to explain the importance of the line, which he eventually agreed to lay flat and have performed. No issues with line placement. Patient continues to report a dry cough but no sob. Has chronic pain to back where his sacral decubitus ulcer is present. No fever, chills. Tolerating clear liquid diet. Continues to request ice persistently.       Scheduled Meds:   sodium chloride 0.9%   Intravenous Once    albuterol sulfate  2.5 mg Nebulization Q4H    amiodarone  200 mg Oral BID    amlodipine  5 mg Oral Daily    enoxaparin  30 mg Subcutaneous Daily    fluticasone-vilanterol  1 puff Inhalation Daily    furosemide  40 mg Oral Daily    hydrALAZINE  50 mg Oral BID    latanoprost  1 drop Both Eyes QHS    lisinopril  10 mg Oral Daily    metOLazone  2.5 mg Oral Daily    metoprolol succinate  50 mg Oral Daily    oxybutynin  10 mg Oral Daily    sodium chloride 0.9%  3 mL Intravenous Q8H    spironolactone  25 mg Oral Daily    tamsulosin  0.4 mg Oral Daily     Continuous Infusions:   Amino acid 2.75% - dextrose 10% (CLINIMIX-E) solution with additives ( 1L provides 27.5 gm AA, 100 gm CHO (340 kcal/L dextrose), Na 35, K 30, Mg 5, Ca 4.5, Acetate 51, Cl 39, Phos 15) 50 mL/hr at 01/27/17 0124    Amino acid 2.75% - dextrose 10% (CLINIMIX-E) solution with additives ( 1L provides 27.5 gm AA, 100 gm CHO (340 kcal/L dextrose), Na 35, K 30, Mg 5, Ca 4.5, Acetate 51, Cl 39, Phos 15)       PRN Meds:sodium chloride 0.9%, acetaminophen, albumin human 25%, albuterol, benzonatate, dextrose 50%, dextrose 50%, diphenhydrAMINE, glucagon (human recombinant), hydrocodone-acetaminophen 5-325mg, flu vacc [id number] 65yr up(PF), insulin aspart, meclizine, ondansetron, promethazine (PHENERGAN) IVPB, ramelteon, tizanidine    Review  of patient's allergies indicates:   Allergen Reactions    Hytrin [terazosin] Shortness Of Breath    Crestor [rosuvastatin] Other (See Comments)     Muscle cramps    Vytorin 10-10 [ezetimibe-simvastatin] Other (See Comments)     Muscle cramps       OBJECTIVE:     Vital Signs (Most Recent)  Temp: 97.1 °F (36.2 °C) (01/27/17 0800)  Pulse: 98 (01/27/17 0921)  Resp: 17 (01/27/17 0921)  BP: 102/63 (01/27/17 0800)  SpO2: 99 % (01/27/17 0921)    Vital Signs Range (Last 24H):  Temp:  [97.1 °F (36.2 °C)-98.3 °F (36.8 °C)]   Pulse:  []   Resp:  [16-21]   BP: ()/(50-63)   SpO2:  [95 %-100 %]     I & O (Last 24H):    Intake/Output Summary (Last 24 hours) at 01/27/17 1111  Last data filed at 01/27/17 0630   Gross per 24 hour   Intake             1135 ml   Output             1900 ml   Net             -765 ml     Physical Exam:  Mild discomfort 2/2 to back pain  Non labored breathing  RRR  Abdomen: distended abdomen but soft with palpable mass to RUQ, no rebound, no guarding  MSKL: swelling to bilateral upper and RLE, Vascular access site to LUE  Back: Stage II Sacral decubitus ulcer    Laboratory:  CBC: pending  CMP:pending    CEA: 5    AFP: > 80,000    Diagnostic Results:  CXR: Central line in good position. Bilateral pleural effusions R>L.     US Abdomen:   1.  Hepatomegaly with heterogenous echotexture and slightly nodular contour may be seen in setting of cirrhosis.  No focal hepatic lesions identified.    2.  Mild splenomegaly.    3.  Moderate volume perihepatic ascites.    4.  Gallbladder sludge.    ASSESSMENT/PLAN:     Amarjit Huggins is a 75 yo M with Liver mass and ESRD on HD MWF admitted for direct transfer with sob and request for liver biopsy  -IR to perform Liver Biopsy on 1/30/17  -Stopped ASA and Plavix  -Follow recs for nephrology - HD today  -Clear liquid diet and TPN  -Speech therapy consult for swallowing  -Follow up AFP levels      Sudeep Ball MD  PGY-2

## 2017-01-27 NOTE — PT/OT/SLP EVAL
Physical Therapy  Evaluation    Amarjit Huggins Jr.   MRN: [id number]   Admitting Diagnosis: Liver tumor    PT Received On: 01/27/17  PT Start Time: 1017     PT Stop Time: 1044    PT Total Time (min): 27 min     Co-eval with OT    Billable Minutes:  Evaluation 15 and Therapeutic Activity 12    Diagnosis: Liver tumor    Past Medical History   Diagnosis Date    Coronary artery disease     Diabetes mellitus     Dialysis patient     Hypertension     Lung disease     Vertigo       Past Surgical History   Procedure Laterality Date    Coronary stent placement         General Precautions: Standard, fall, hearing impaired  Orthopedic Precautions: N/A   Braces:         Do you have any cultural, spiritual, Latter day conflicts, given your current situation?: none    Patient History:  Lives With: child(rodo), adult  Living Arrangements: house  Transportation Available: family or friend will provide  Living Environment Comment: pt lives with son in a [facility] with 1 ELIZ. Tub/shower combo. Has had decline in mobility since November. Went from ambulating without AD to cane to SW. Prior to admit pt required assistance with ADLs and transfers to BSC.   Equipment Currently Used at Home: bedside commode, walker, standard, shower chair  DME owned (not currently used): single point cane and wheelchair    Previous Level of Function:  Ambulation Skills: needs device  Transfer Skills: needs assist  ADL Skills: needs assist    Subjective:  Communicated with RN prior to session.  The primary encounter diagnosis was Liver tumor. Diagnoses of Aspiration into airway, initial encounter, Pleural effusion, ESRD (end stage renal disease), Edema due to malnutrition, due to unspecified malnutrition type, Anemia of chronic renal failure, stage 5, Malnutrition compromising bodily function, and Cirrhosis of liver with ascites, unspecified hepatic cirrhosis type were also pertinent to this visit.  Chief Complaint: pain and fatigue  Patient goals: improve  mobility               Location:  (did not rate. c/o pain to sacral area 2/2 wound)          Objective:   Patient found with: oxygen, central line     Cognitive Exam:  Oriented to: Person and Place. Disoriented to time.     Follows Commands/attention: Follows one-step commands  Communication: clear/fluent  Safety awareness/insight to disability: intact    Physical Exam:  Postural examination/scapula alignment: Rounded shoulder and Head forward    Skin integrity: weeping UEs / thin skin and bruising throughout  Edema: Moderate R LE    Sensation:   Intact   Pt reports parasthesias to B LEs    Lower Extremity Range of Motion:  Right Lower Extremity: WFL  Left Lower Extremity: WFL    Lower Extremity Strength:  B LEs grossly 3+/5     Fine motor coordination:  NT    Gross motor coordination: WFL    Functional Mobility:  Bed Mobility:  Rolling/Turning to Left: Minimum assistance  Rolling/Turning Right: Minimum assistance  Scooting/Bridging:  (maxA for seated scooting forward ; modA for seated scooting laterally)  Supine to Sit: Moderate Assistance  Sit to Supine: Maximum Assistance    Transfers:  Sit <> Stand Assistance: Moderate Assistance  Sit <> Stand Assistive Device: Standard Walker    Gait:    Not Assessed     Balance:   Static Sit: POOR+: Needs MINIMAL assist to maintain  Dynamic Sit: POOR: N/A  Static Stand: POOR+: Needs MINIMAL assist to maintain  Dynamic stand: POOR: N/A    Therapeutic Activities and Exercises:  Initial PT evaluation completed as documented.   Sitting at EOB X~5 minutes with min-modA  Static standing X~20 seconds with [place] with pt with forward flexed posture and head down  Bed mobility as documented above    AM-PAC 6 CLICK MOBILITY  How much help from another person does this patient currently need?   1 = Unable, Total/Dependent Assistance  2 = A lot, Maximum/Moderate Assistance  3 = A little, Minimum/Contact Guard/Supervision  4 = None, Modified [place]/Independent    Turning over in bed  (including adjusting bedclothes, sheets and blankets)?: 3  Sitting down on and standing up from a chair with arms (e.g., wheelchair, bedside commode, etc.): 2  Moving from lying on back to sitting on the side of the bed?: 2  Moving to and from a bed to a chair (including a wheelchair)?: 2  Need to walk in hospital room?: 1  Climbing 3-5 steps with a railing?: 1  Total Score: 11     AM-PAC Raw Score CMS G-Code Modifier Level of Impairment Assistance   6 % Total / Unable   7 - 9 CM 80 - 100% Maximal Assist   10 - 14 CL 60 - 80% Moderate Assist   15 - 19 CK 40 - 60% Moderate Assist   20 - 22 CJ 20 - 40% Minimal Assist   23 CI 1-20% SBA / CGA   24 CH 0% Independent/ Mod I     Patient left left sidelying with all lines intact, call button in reach, bed alarm on, RN notified and family present.    Assessment:   Amarjit Huggins Jr. is a 76 y.o. male with a medical diagnosis of Liver tumor and presents with decreased strength and endurance, impaired functional mobility, pain, and decline in independence with mobility. Pt somewhat agitated throughout session however participating in evaluation. Patient will continue to benefit from therapy services to address impairments and progress functional mobility as able.     Rehab identified problem list/impairments: Rehab identified problem list/impairments: weakness, impaired endurance, impaired self care skills, impaired functional mobilty, impaired balance, decreased upper extremity function, decreased lower extremity function, pain, impaired skin, edema    Rehab potential is good.    Activity tolerance: Fair    Discharge recommendations: Discharge Facility/Level Of Care Needs: nursing facility, skilled     Barriers to discharge: Barriers to Discharge: None    Equipment recommendations:       GOALS:   Physical Therapy Goals        Problem: Physical Therapy Goal    Goal Priority Disciplines Outcome Goal Variances Interventions   Physical Therapy Goal     PT/OT, PT Ongoing  (interventions implemented as appropriate)     Description:  Goals to be met by: 17     Patient will increase functional independence with mobility by performin. Supine to sit with Stand-by Assistance  2. Sit to supine with Stand-by Assistance  3. Rolling to Left and Right with Modified [place].  4. Sit to stand transfer with Contact Guard Assistance with walker  5. Bed to chair transfer with Minimal Assistance using Rolling Walker  6. Sitting at edge of bed x20 minutes with Supervision  7. Stand for 3 minutes with Stand-by Assistance using Rolling Walker                PLAN:    Patient to be seen 6 x/week to address the above listed problems via gait training, therapeutic activities, therapeutic exercises, wheelchair management/training  Plan of Care expires: 17  Plan of Care reviewed with: patient, son, daughter    Functional Assessment Tool Used: ampac  Score: 11  Functional Limitation: Mobility: Walking and moving around  Mobility: Walking and Moving Around Current Status (): CL  Mobility: Walking and Moving Around Goal Status (): NEVAEH Azar DPT  2017

## 2017-01-27 NOTE — PROGRESS NOTES
received 142 from the [facility] giving nursing facility admission approval effective 01//23/17.   attached pasrr and 142 form in [facility] nursing facilities to review.      made phone contact with Renetta Dela Cruz at [facility] to check status of referral.  Renetta Dela Cruz reported no available bed today.  They will have to reassess patient in Monday if they have an available bed.     attempted to make phone contact with [facility] admit coordinator, to check status of referral.  No one was available, left voice message.    [facility] declined referral via [facility] system, no dialysis bed.     made phone contact with [facility] admit coordinator, Princess to check status of referral.  Princess reported patient is accepted for admission, pending discharge orders.

## 2017-01-27 NOTE — PROGRESS NOTES
Paged Dr. Sears. OR surgery staff answered Dr. Sears's phone. Explained to staff who relayed to Dr. Sears that anesthesiologist, Dr. Magallanes saw the pt for IV access. Pt refusing anesthesia to place any access in the neck or subclavian. Per OR staff, MD verbalized acknowledgement. No orders received at this time. Will continue to monitor.

## 2017-01-27 NOTE — PLAN OF CARE
Problem: Patient Care Overview  Goal: Plan of Care Review  SpO2   98% on   2lpm NC. Pt getting   1000mL of predicted   2300mL on incentive spirometry. Pt encouraged to continue deep breathing and coughing. No apparent distress noted. Will continue to monitor

## 2017-01-27 NOTE — PROGRESS NOTES
received phone call [facility] admit coordinator, Norma Mckeon.  She reported they cannot accept due to need for dialysis.    [facility] admit coordinator, Grzegorz present at bedside this am to complete an assessment for nursing facility admission.

## 2017-01-27 NOTE — PROGRESS NOTES
attempted to make phone contact with long term access services to complete a level of care eligibility tool (LOCET) for nursing facility admission approval.  They were experiencing high volume of calls.   was informed someone will make phone contact with her to complete LOCET.

## 2017-01-27 NOTE — ANESTHESIA PROCEDURE NOTES
"Central Line    Diagnosis: Needs TPN, no iv access  Doctor requesting consult: Medicine service  Patient location during procedure: floor  Staffing  Anesthesiologist: RAFAEL PALENCIA  Performed by: anesthesiologist   Anesthesiologist was present at the time of the procedure.  Preanesthetic Checklist  Completed: anesthesia consent given  Indication  Indication: vascular access, med administration     Anesthesia   local infiltration  Local Infiltration: lidocaine 1% without epinephrine    Central Line  Skin Prep: skin prepped with ChloraPrep, skin prep agent completely dried prior to procedure  hand hygiene performed prior to central venous catheter insertion  Location: right internal jugular,   Catheter type: triple lumen  Catheter Size: 7 Fr  Inserted Catheter Length: 13 cm  Ultrasound: vascular probe with ultrasound  Vessel Caliber: medium, compressibility normal  Needle advanced into vessel with real time Ultrasound guidance.  Sterile sheath used.  Image recorded and saved.  Insertion Attempts: 2   Securement:line sutured, chlorhexidine patch, sterile dressing applied and blood return through all ports     Post-Procedure  Additional Notes  [facility] CANNULATION VEIN PUNCT SUMMARY  (cossecant-limited dart microaccess technique)     Date      =[id number]  DI tP      =[id number]   - Age      =76   - Ht      =1.8   - Wt      =86   - Sex      =M     Vein   - location (IJ,SCL,F)    =IJ   - side (R/L)     =R     - distance skin to superficial wall (mm) =10            - distance skin to midlumen (mm)  =14   - distance skin to interior wall (mm)  = 19         - vertical diameter (mm)   =8   - transverse diameter (mm)   =9    Needle   - gauge                                                         =(1) 20ga 1" Bowman cath over 21ga 2" A-bevel needle => (2) Cook Microuncture CSV needle   - tip bevel (A,B)    =(1) B (2) A0   - length (mm) (25  38  51  70  74  75             =40   - insertion angle (60º,45º,30º,20º)  =~45   - " "cosecant (1.15, 1.4, 2.0, 2.9)  =1.4                          - max depth needle insert (+ blood return) =15?   - use of needle stop?                                    =no              Success? (Y/N)    =Y  Overshoot?  (Y/N)    =N  # skin punctures    =2  # total re-directs    =2                     Experience of person performing ([facility]) =H     Note: Attempted very low puncture using 1" 20 ga cath =>             blood return but not once needle removed;             2nd attempt with CSV needle which worked as usual                     "

## 2017-01-27 NOTE — PT/OT/SLP EVAL
Occupational Therapy  Evaluation/Treatment     Amarjit Huggins Jr.   MRN: [id number]   Admitting Diagnosis: Liver tumor    OT Date of Treatment: 01/27/17   OT Start Time: 1016  OT Stop Time: 1044  OT Total Time (min): 28 min    Billable Minutes:  Evaluation 10  Therapeutic Activity 18    Diagnosis: Liver tumor   The primary encounter diagnosis was Liver tumor. Diagnoses of Aspiration into airway, initial encounter, Pleural effusion, ESRD (end stage renal disease), Edema due to malnutrition, due to unspecified malnutrition type, Anemia of chronic renal failure, stage 5, Malnutrition compromising bodily function, and Cirrhosis of liver with ascites, unspecified hepatic cirrhosis type were also pertinent to this visit.      Past Medical History   Diagnosis Date    Coronary artery disease     Diabetes mellitus     Dialysis patient     Hypertension     Lung disease     Vertigo       Past Surgical History   Procedure Laterality Date    Coronary stent placement             General Precautions: Standard, fall  Orthopedic Precautions: N/A  Braces:            Patient History:  Living Environment  Lives With: child(ordo), adult  Living Arrangements: house  Home Accessibility:  (threshold; tub/shower combo)  Living Environment Comment: Pt lives with son in [facility], threshold/1 step to enter, tub/shower combo. Pt currently with decline in function since November, requiring assist for all functional mobility and ADLs. Has BSC, RW, SPC, shower chair   Equipment Currently Used at Home: bedside commode, walker, rolling, shower chair, cane, straight, wheelchair    Prior level of function:   Bed Mobility/Transfers: needs device and assist  Grooming: needs assist  Bathing: needs device and assist  Upper Body Dressing: needs assist  Lower Body Dressing: needs assist  Toileting: needs device and assist  Home Management Skills: unable to perform  Driving License: No  Mode of Transportation: Family     Dominant hand:  right    Subjective:  Communicated with nsg prior to session.    Chief Complaint: pain; buttocks hurting from sore   Patient/Family stated goals: increase function     Pain Rating:  (did not rate )                   Objective:       Cognitive Exam:  Oriented to: Person, Place and year  Follows Commands/attention: Easily distracted and Follows one-step commands  Communication: clear/fluent  Memory:  Impaired LTM  Safety awareness/insight to disability: impaired  Coping skills/emotional control: Appropriate to situation        Physical Exam:  Postural examination/scapula alignment: Rounded shoulder, Head forward and Kyphosis  Skin integrity: Wound sacrum per chart; BUEs wrapped in bandaging   Edema: Moderate BLEs; BUEs    Sensation:   Reports impaired to BLEs    Upper Extremity Range of Motion:  Right Upper Extremity: WFL  Left Upper Extremity: WFL    Upper Extremity Strength:  Right Upper Extremity: Deficits: 3+/5  Left Upper Extremity: Deficits: 3+/5   Strength: fair +     Fine motor coordination:   Intact      Functional Mobility:  Bed Mobility:  Supine to Sit: Moderate Assistance  Sit to Supine: Maximum Assistance    Transfers:  Sit <> Stand Assistance: Moderate Assistance  Sit <> Stand Assistive Device: Rolling Walker    Functional Ambulation: did not occur     Activities of Daily Living:    LE Dressing Level of Assistance: Total assistance      Balance:   Static Sit: FAIR-: Maintains without assist but inconsistent   Dynamic Sit: FAIR: Cannot move trunk without losing balance  Static Stand: POOR: Needs MODERATE assist to maintain  Dynamic stand: POOR: N/A    Therapeutic Activities and Exercises:  - bed mobility with increased time, HOB, education on use of rails and BUEs  - Sitting EOB with static and dynamic axs; LOB to R and forward requiring assist to maintain; head flexed forward; cues to maintain upright position  - Functional transition from EOB with RW; Static standing in RW   - Seated lateral  "scooting x 3     AM-PAC 6 CLICK ADL  How much help from another person does this patient currently need?  1 = Unable, Total/Dependent Assistance  2 = A lot, Maximum/Moderate Assistance  3 = A little, Minimum/Contact Guard/Supervision  4 = None, Modified [place]/Independent    Putting on and taking off regular lower body clothing? : 1  Bathing (including washing, rinsing, drying)?: 1  Toileting, which includes using toilet, bedpan, or urinal? : 2  Putting on and taking off regular upper body clothing?: 2  Taking care of personal grooming such as brushing teeth?: 3  Eating meals?: 3  Total Score: 12    AM-PAC Raw Score CMS "G-Code Modifier Level of Impairment Assistance   6 % Total / Unable   7 - 9 CM 80 - 100% Maximal Assist   10 - 14 CL 60 - 80% Moderate Assist   15 - 19 CK 40 - 60% Moderate Assist   20 - 22 CJ 20 - 40% Minimal Assist   23 CI 1-20% SBA / CGA   24 CH 0% Independent/ Mod I       Patient left left sidelying with all lines intact, call button in reach, bed alarm on, nsg notified and family  present    Assessment:  Amarjit Huggins Jr. is a 76 y.o. male with a medical diagnosis of Liver tumor and presents with decline in function. Pt would benefit from cont OT services in order to maximize functional independence. Recommending SNF at d/c     Rehab identified problem list/impairments: Rehab identified problem list/impairments: weakness, impaired self care skills, impaired balance, decreased safety awareness, impaired skin, pain, edema, decreased upper extremity function, impaired functional mobilty, impaired endurance, gait instability, decreased lower extremity function, impaired cognition    Rehab potential is good.    Activity tolerance: Fair    Discharge recommendations: Discharge Facility/Level Of Care Needs: nursing facility, skilled     Barriers to discharge: Barriers to Discharge: Decreased caregiver support, Inaccessible home environment    Equipment recommendations: hospital bed "     GOALS:   Occupational Therapy Goals        Problem: Occupational Therapy Goal    Goal Priority Disciplines Outcome Interventions   Occupational Therapy Goal     OT, PT/OT Ongoing (interventions implemented as appropriate)    Description:  Goals to be met by: 2/27/17     Patient will increase functional independence with ADLs by performing:    LE Dressing with Moderate Assistance.  Grooming while seated with Stand-by Assistance.  Toileting from bedside commode with Min A for hygiene and clothing management.   Supine to sit with Stand-by Assistance.  Stand pivot transfers with CGA.  Toilet transfer to bedside commode with Contact Guard Assistance.  Increased functional strength to 4-/5 for self care skills and functional mobility .  Upper extremity exercise program x10 reps per handout, with supervision.                PLAN:  Patient to be seen 5 x/week to address the above listed problems via self-care/home management, therapeutic activities, therapeutic exercises  Plan of Care expires: 02/27/17  Plan of Care reviewed with: patient, son, daughter    OT G-codes  Functional Assessment Tool Used: am pac  Score: 12  Functional Limitation: Self care  Self Care Current Status (): CL  Self Care Goal Status (): NEVAEH Palomo OT  01/27/2017

## 2017-01-27 NOTE — PLAN OF CARE
Problem: Physical Therapy Goal  Goal: Physical Therapy Goal  Goals to be met by: 17     Patient will increase functional independence with mobility by performin. Supine to sit with Stand-by Assistance  2. Sit to supine with Stand-by Assistance  3. Rolling to Left and Right with Modified [place].  4. Sit to stand transfer with Contact Guard Assistance with walker  5. Bed to chair transfer with Minimal Assistance using Rolling Walker  6. Sitting at edge of bed x20 minutes with Supervision  7. Stand for 3 minutes with Stand-by Assistance using Rolling Walker  Outcome: Ongoing (interventions implemented as appropriate)  Initial PT evaluation completed. Pt will continue to benefit from therapy services.      Recommend SNF upon d/c.

## 2017-01-27 NOTE — PLAN OF CARE
Problem: Occupational Therapy Goal  Goal: Occupational Therapy Goal  Goals to be met by: 2/27/17     Patient will increase functional independence with ADLs by performing:    LE Dressing with Moderate Assistance.  Grooming while seated with Stand-by Assistance.  Toileting from bedside commode with Min A for hygiene and clothing management.   Supine to sit with Stand-by Assistance.  Stand pivot transfers with CGA.  Toilet transfer to bedside commode with Contact Guard Assistance.  Increased functional strength to 4-/5 for self care skills and functional mobility .  Upper extremity exercise program x10 reps per handout, with supervision.  Outcome: Ongoing (interventions implemented as appropriate)  Pt would benefit from cont OT services in order to maximize functional independence. Recommending SNF at d/c

## 2017-01-28 PROBLEM — C22.0 HCC (HEPATOCELLULAR CARCINOMA): Status: ACTIVE | Noted: 2017-01-01

## 2017-01-28 NOTE — PROGRESS NOTES
Progress Note  [facility]    Admit Date: 1/25/2017   LOS: 3 days     SUBJECTIVE:     Follow-up For:  ESRD    Review of Systems:  Patient and family reviewed UF session. Daughter and son are upset and demanding answers about liver biopsy.  They are in agreement for HD session today.  OBJECTIVE:     Vital Signs (Most Recent)  Temp: 97.2 °F (36.2 °C) (01/28/17 0800)  Pulse: 71 (01/28/17 1256)  Resp: 16 (01/28/17 1256)  BP: (!) 93/53 (01/28/17 0800)  SpO2: 98 % (01/28/17 1256)    Vital Signs Range (Last 24H):  Temp:  [97.2 °F (36.2 °C)-100.1 °F (37.8 °C)]   Pulse:  [71-98]   Resp:  [16-20]   BP: ()/(36-56)   SpO2:  [95 %-98 %]        Physical Exam:   Gen: cachexia  CVS: no murmurs  Lungs decrease breath sounds  Abdomen soft +BS/ascites  Ext: + 2 pitting edema + anasarca    Laboratory:  CBC:     Recent Labs  Lab 01/28/17 0437   WBC 11.39   RBC 3.30*   HGB 9.0*   HCT 28.4*   *   MCV 86   MCH 27.3   MCHC 31.7*     BMP:     Recent Labs  Lab 01/28/17 0437   *   CL 95   CO2 20*   BUN 43*   CREATININE 4.8*   CALCIUM 8.4*   MG 1.7     CMP:     Recent Labs  Lab 01/28/17 0437   *   CALCIUM 8.4*   ALBUMIN 1.8*   PROT 5.6*   *   K 4.4   CO2 20*   CL 95   BUN 43*   CREATININE 4.8*   ALKPHOS 410*   ALT 18   AST 40   BILITOT 1.0     LFTs:     Recent Labs  Lab 01/28/17 0437   ALT 18   AST 40   ALKPHOS 410*   BILITOT 1.0   PROT 5.6*   ALBUMIN 1.8*         ASSESSMENT/PLAN:     Active Hospital Problems    Diagnosis  POA    *Liver tumor [D49.0]  Yes    HCC (hepatocellular carcinoma) [C22.0]  Yes    Edema due to malnutrition [E43]  Yes    Malnutrition compromising bodily function [E46]  Yes    Cirrhosis of liver with ascites [K74.60]  Yes    Thrombocytopenia [D69.6]  Yes    Anemia of chronic renal failure, stage 5 [N18.5, D63.1]  Yes    Anticoagulated by anticoagulation treatment [Z79.01]  Not Applicable    ESRD (end stage renal disease) [N18.6]  Yes    Aspiration into airway [T17.908A]   Yes    Edema [R60.9]  Yes    Chronic obstructive pulmonary disease [J44.9]  Yes    Pleural effusion [J90]  Yes      Resolved Hospital Problems    Diagnosis Date Resolved POA   No resolved problems to display.     1- ESRD on HD  2- hyponatremia d/t hypervolemia ? d/t CHF vs liver cirrhosis  3- liver mass ? malignancy  4- CAD s/p PCI on dual antiplatelet therapy  5- DM  6- COPD  7- severe protein calorie malnutrition   8- hypotension during HD probably d/t intravascular depletion      Plan:      -- will plan for HD session today x 3 hrs and UF goal 2-3 liters  -- could use albumin 25% IV during HD if MAP <60  -- recommend adding midodrine 5 mg p.o TID  -- daily labs and weight  -- consult nutrition.     Maryse Bello MD  [facility] PGY-4

## 2017-01-28 NOTE — PLAN OF CARE
Problem: Patient Care Overview  Goal: Plan of Care Review  Outcome: Ongoing (interventions implemented as appropriate)  Patient is resting in bed, NAD noted, PRN pain medication given per MAR.  No complaints of nausea or vomiting.  Patient diet changed to NPO with ice chips.  Ointment at the bedside to be applied to arm abrasions/tears.  Patient went to dialysis today.  Family at the bedside.  Safety has been maintained.  Will continue to monitor.

## 2017-01-28 NOTE — NURSING
Dr. Sears notified pt's manual blood pressure 106/40 with HR 85. Ok to give amniodarone but hold hydralazine tonight.

## 2017-01-28 NOTE — PLAN OF CARE
Problem: SLP Goal  Goal: SLP Goal  Short Term Goals:  1. Pt will participate in ongoing swallow assessment to determine least restrictive diet.   2. Pt will tolerate ice chips and 5 ml of water by tsp or self regulated cup swallow w/ max cues by SLP with no audible aspiration signs.   3. Pt will tolerate tsp bites of pudding only with max cues by SLP with no audible aspiration signs.   4. Pt will successfully participate in Modified Barium Swallow study to radiographically assess swallow function, rule out aspiration and determine safest/least restrictive diet as deemed appropriate by SLP.    Outcome: Ongoing (interventions implemented as appropriate)  1/28/17: Swallow eval completed this am, pt very lethargic at bedside, coughing on limited trials of ice, water and pudding trials consistent with + s/s of dysphagia. Pt with wet, audible respirations and delayed cough after unregulated sips of water via cup and straw presentations. REC: NPO for now except for ice chips only at this time with ongoing assessment pending improvement in alertness level. RN is aware of above. Pt is NOT safe for a solid po diet at this time.   RENEE Gongora, [facility]-SLP  Speech Pathologist 1/28/2017

## 2017-01-28 NOTE — PROGRESS NOTES
Progress Note  General Surgery    Admit Date: 1/25/2017  Post-operative Day:    Hospital Day: 4    SUBJECTIVE:     No acute events.  Marginal BP.  Improved this am.  No HD yesterday.   Complains of neck and shoulder pain this am.        Scheduled Meds:   sodium chloride 0.9%   Intravenous Once    albuterol sulfate  2.5 mg Nebulization Q4H    amiodarone  200 mg Oral BID    amlodipine  5 mg Oral Daily    enoxaparin  30 mg Subcutaneous Daily    fluticasone-vilanterol  1 puff Inhalation Daily    furosemide  40 mg Oral Daily    hydrALAZINE  50 mg Oral BID    latanoprost  1 drop Both Eyes QHS    lisinopril  10 mg Oral Daily    metOLazone  2.5 mg Oral Daily    metoprolol succinate  50 mg Oral Daily    oxybutynin  10 mg Oral Daily    sodium chloride 0.9%  3 mL Intravenous Q8H    spironolactone  25 mg Oral Daily    tamsulosin  0.4 mg Oral Daily     Continuous Infusions:   Amino acid 2.75% - dextrose 10% (CLINIMIX-E) solution with additives ( 1L provides 27.5 gm AA, 100 gm CHO (340 kcal/L dextrose), Na 35, K 30, Mg 5, Ca 4.5, Acetate 51, Cl 39, Phos 15) 50 mL/hr at 01/27/17 2048     PRN Meds:sodium chloride 0.9%, acetaminophen, albumin human 25%, albuterol, benzonatate, dextrose 50%, dextrose 50%, diphenhydrAMINE, glucagon (human recombinant), hydrocodone-acetaminophen 5-325mg, flu vacc [id number] 65yr up(PF), insulin aspart, meclizine, ondansetron, promethazine (PHENERGAN) IVPB, ramelteon, tizanidine    Review of patient's allergies indicates:   Allergen Reactions    Hytrin [terazosin] Shortness Of Breath    Crestor [rosuvastatin] Other (See Comments)     Muscle cramps    Vytorin 10-10 [ezetimibe-simvastatin] Other (See Comments)     Muscle cramps       OBJECTIVE:       Vital Signs Range (Last 24H):  Temp:  [97.1 °F (36.2 °C)-100.1 °F (37.8 °C)]   Pulse:  []   Resp:  [16-20]   BP: ()/(36-63)   SpO2:  [95 %-99 %]     I & O (Last 24H):    Intake/Output Summary (Last 24 hours) at 01/28/17  0732  Last data filed at 01/28/17 0406   Gross per 24 hour   Intake              720 ml   Output                0 ml   Net              720 ml     Physical Exam:  Mild discomfort 2/2 to back pain  Non labored breathing  RRR  Abdomen: distended abdomen but soft with palpable mass to RUQ, no rebound, no guarding  MSKL: swelling to bilateral upper and RLE, Vascular access site to LUE  Back: Stage II Sacral decubitus ulcer    Laboratory:  WBC 11  H/H 9/28.4  Plt 128    AFP: > 80,000    ASSESSMENT/PLAN:     Amarjit Huggins is a 75 yo M with Liver mass and ESRD on HD MWF admitted for direct transfer with sob and request for liver biopsy  - AFP > 80k, likely HCC  - ? Need for bx  - f/u onc recs  - HD per renal   - monitor BP  - pain control  - SW working on placement     Dante Sears MD

## 2017-01-28 NOTE — CONSULTS
Consult Note    Inpatient consult to Hematology/Oncology  Consult performed by: NELSON LATHAM  Consult ordered by: GILBERTO ARIAS        SUBJECTIVE:   Reason For Consultation: Presumed HCC  History of Present Illness:  Patient is a 76 y.o. male   Patient is a 76 y.o. male with pmhx CHF, ESRD HD dependent , CAD with cardiac stent, DM, HTN, COPD who is a direct admit to undergo evaluation of  Liver. History obtained from chart. Pt unable to provide history. Family currently not at bedside. Pt noted to have liver mass on CT performed in [facility].Pt developed SOB x 3 days. Family requested transfer to [place] for further evaluation of liver mass.  IR guided biopsy planned Monday 1/30/2017. Abd US reveals hepatosplenomegaly with no hepatic masses noted. CT performed at [facility]. AFP level elevated >80,000ng/ml.    Review of patient's allergies indicates:   Allergen Reactions    Hytrin [terazosin] Shortness Of Breath    Crestor [rosuvastatin] Other (See Comments)     Muscle cramps    Vytorin 10-10 [ezetimibe-simvastatin] Other (See Comments)     Muscle cramps     Past Medical History   Diagnosis Date    Coronary artery disease     Diabetes mellitus     Dialysis patient     Hypertension     Lung disease     Vertigo      Past Surgical History   Procedure Laterality Date    Coronary stent placement       Family History   Problem Relation Age of Onset    No Known Problems Mother     Diabetes Father     No Known Problems Sister     No Known Problems Brother     No Known Problems Maternal Aunt     Melanoma Neg Hx      Social History   Substance Use Topics    Smoking status: Former Smoker     Packs/day: 2.00     Years: 45.00     Types: Cigarettes     Quit date: 8/3/2000    Smokeless tobacco: Never Used    Alcohol use No     ROS unobtainable  OBJECTIVE:     Vital Signs:  Temp:  [97.2 °F (36.2 °C)-97.9 °F (36.6 °C)]   Pulse:  [71-97]   Resp:  [16-18]   BP: ()/(53-56)   SpO2:   [95 %-98 %]     Physical Exam   Physical Exam   Constitutional: He appears chronically ill, confused in NAD  Cardiovascular: Normal rate, regular rhythm and normal heart sounds. No murmurs  Pulmonary/Chest: Non-labored breathing. CTA B. No wheezes  Abdominal: Soft. Bowel sounds are normal. Abd distended. Slight TTP RLQ. No rebound/guarding  Musculoskeletal: anasarca  Psychiatric: He is slightly agitated, not answering questions             Laboratory:Reviewed  Lab Results   Component Value Date    WBC 11.39 01/28/2017    HGB 9.0 (L) 01/28/2017    HCT 28.4 (L) 01/28/2017    MCV 86 01/28/2017     (L) 01/28/2017       Results for DARBY PONCE SALLIE FELIX (MRN [id number]) as of 1/28/2017 13:10   Ref. Range 1/25/2017 22:28   AFP Latest Ref Range: 0.0 - 8.4 ng/mL >26880 (H)     Diagnostic Results:  Reviewed    US ABD  Hepatomegaly with coarse hepatic echotexture similar to recent exam. No focal lesions identified.     Hepatic vasculature appears within normal limits.    Moderate volume of ascites.    Sludge in the gallbladder.    Renal cortical thinning.  ASSESSMENT/PLAN:      77 yo M with ESRD HD dependent, HTN, COPD, DM, HTN , COPD with liver mass noted on outside imaging studies with findings concerning for HCC.      Plan: CT imaging study(3phase?)  performed at outside facility not available for review. In setting of patient with known underlying cirrhosis and CT imaging (3-phase)  findings bx may not be necessarily warranted. Otherwise, proceed with liver bx as planned for histologic confirmation.Pt with significant   comorbidities and poor PS and likely not a candidate for therapy.  Family currently not at bedside.SW consulted for long term placement options.

## 2017-01-28 NOTE — PLAN OF CARE
Problem: Hemodialysis (Adult)  Goal: Signs and Symptoms of Listed Potential Problems Will be Absent, Minimized or Managed (Hemodialysis)  Signs and symptoms of listed potential problems will be absent, minimized or managed by discharge/transition of care (reference Hemodialysis (Adult) [facility]).    01/28/17 1650   Hemodialysis   Problems Assessed (Hemodialysis) electrolyte imbalance;fluid imbalance   Problems Present (Hemodialysis) electrolyte imbalance;fluid imbalance   Hd with uf

## 2017-01-28 NOTE — PT/OT/SLP PROGRESS
Physical Therapy      Amarjit SALLIE BlairJoseluis Jr.  MRN: [id number]    Patient not seen today secondary to Dialysis (Pt's daughter stated her dad would be too tired after dialysis treatment to receive therapy treatment. Requested to come back tomorrow for therapy.). Will follow-up tomorrow, January 29, 2017.    Rosa Sanz, PTA

## 2017-01-28 NOTE — PLAN OF CARE
Problem: Patient Care Overview  Goal: Plan of Care Review  Outcome: Ongoing (interventions implemented as appropriate)  Pt lying in bed alert and oriented. No signs of distress noted. C/o pain to back buttock, repositioned for comfort q2, PRN norco given provided mild relief. Albumin given for hypotension. Responded well. TB PPD administered to right arm forearm. Tele NSR with HR 80's. Will continue to monitor.

## 2017-01-28 NOTE — PT/OT/SLP EVAL
Speech Language Pathology  Swallow Evaluation/Family Education     Amarjit Huggins Jr.   MRN: [id number]   Admitting Diagnosis: Liver tumor    Diet recommendations: Solid Diet Level: NPO  Liquid Diet Level:  (ice chips sparingly, water by tsp from SLP only )   Upright for meals if administered any crush po meds, pt must be alert and awake to take in essential medications, please crush all meds and bury in pudding as tolerated by patient, pt is deemed appropriate for ice chips sparingly and tsp swallows of water only SLP! Pt is NOT Safe for oral diet at this time. Pt with fluctuating alertness level. Family present during entire swallow eval.     SLP Treatment Date: 01/28/17  Speech Start Time: 1122     Speech Stop Time: 1148     Speech Total (min): 26 min       TREATMENT BILLABLE MINUTES:  Eval Swallow and Oral Function 11 and [place] Care/Home Management Training 15    Diagnosis: Liver tumor  Patient is a 76 y.o. male with a complicated PMHx including CHF, ESRD on HD MWF, CAD with cardiac stent, DM, HTN, COPD who is a direct admit to have liver mass evaluated. For the past month, the patient has been having intermittent episodes of sob not quite relieved with his home medications. He has been identified to have a Liver mass seen on CT in the hospital at [place] and is waiting on appointment to be seen with NET clinic at [place]. Three days ago, patient developed sob and was taken to the hospital in [place] but workup for his liver mass was requested to be done at [facility] by family. Thus, patient was transferred. Currently, the patient is perseverating on water and will not answer any other questions.    Past Medical History   Diagnosis Date    Coronary artery disease     Diabetes mellitus     Dialysis patient     Hypertension     Lung disease     Vertigo      Past Surgical History   Procedure Laterality Date    Coronary stent placement         Has the patient been evaluated by SLP for swallowing? :  "Yes  Keep patient NPO?: Yes   General Precautions: Standard, aspiration, fall (lethargic, standard precautions)    Current Respiratory Status:  (room air)     Social Hx: Patient lives in [place], transferred from [facility], family in room report pt was independent prior to liver mass.     Prior diet: Per daughter and son, pt was on a regular diet at home and drank thin liquids, pt did wear dentures when eating solid textures. New onset of dysphagia reported. Family concerned that "he is not eating."    Subjective:  Consult received this am for swallow eval and cognitive assessment. SLP did clear with RN for swallow assessment. Upon entry into room, family present. Family voiced concern that pt was too sleepy and inquired why. SLP deferred their questions to RN who was paged to room. SLP explained her role of swallowing at this time. They nodded in agreement.   Patient goals: "I'm hurting, my side and my throat."    Pain Ratin/10        Location:  (R side and throat pain reported)  Pain Addressed: Distraction, Nurse notified       Objective:   Patient found with: central line  Consult received for swallow study at the bedside and possible cognitive eval as able.     Cognition: difficult to fully assess at this time, pt was awake but remained lethargic. Pt required max verbal,modal and kinesthetic cues to maintain attention for at least 2-3 minutes at a time. Pt did alert to voice.  Pt with R ear cochlear implant in place.     Verbal expression: fluent verbal output but limited to 3-4 words per sentence length.     Comprehension: limited to basic one step oral motor commands approx. 60% consistent with max ongoing tactile and verbal cues.     Oral Musculature Evaluation  Oral Musculature: general weakness  Dentition:  (upper dentures in place, missing bottom dentures, pt refused to put in oral cavity when asked by SLP)  Secretion Management: problems swallowing secretions, coughing on " secretions  Mucosal Quality: dry, cracked (xerostomia present at rest)  Mandibular Strength and Mobility:  (difficulty to assess)  Oral Labial Strength and Mobility:  (impaired strength noted, max cues to perform labial pucker)  Lingual Strength and Mobility: impaired strength (max cues to perform lingual ex to assess)  Velar Elevation:  (unable to view)  Buccal Strength and Mobility: decreased tone  Volitional Cough: wet cough noted  Volitional Swallow: delayed palpation upon finger palpation to larynx  Voice Prior to PO Intake: breathy and hoarse voicing noted  Oral Musculature Comments: generalized weakness noted, difficult to complete full OM exam 2/2 pt's lethargic state     Bedside Swallow Eval:  Consistencies Assessed: Thin liquids water by tsp x4, cup swallow x4  and Puree pudding by tsp x4 trials      Oral Phase: dcr'd labial seal noted, dcr'd bolus formation, and slow a/p transfer noted, pt with very dry/cracked tongue/ lips, xerostomia noted. Pt does report odynophagia when swallowing tsp and cup swallows of water.     Pharyngeal Phase: Pt does exhibit + s/s of pharyngeal dysphagia c/b pharyngeal delay per subjective assessment of finger palpation of larynx during swallows of water and pudding. Pt with immediate cough after straw sips of water. Pt also demonstrated wet respirations, breathy and hoarse voice, wet voice after coughing and did not elicit additional hard, effortful cough when asked by SLP. Pt was encouraged to perform hard swallows and clear wet voice with minimal success. SLP did notify RN to hook up oral suctioning as needed. Pharyngeal phase of the swallow is also adversely impacted by pt's lethargic state.     Additional Treatment:  Family education on role of SLP and encourage consumption of ice chips sparingly at this time. Explained to family that pt is not safe for uncontrolled amounts of liquids at this time by straw and no ready for solid po diet. They nodded in agreement but did  ask about giving pt more pain meds despite his already lethargic state.     FIM:  Social Interaction: 3 Moderate Direction--The patient interacts appropriately 50 to 74% of the time.         Comprehension: 4 (has R ear cochlear implant) Minimal Prompting--The patient understands directions and conversation about basid daily needs 75 to 90% of the time. (has R ear cochlear implant)   Expression: 3 Moderate Prompting--The patient expresses basic daily needs and ideas 50 to 74% of the time.          Assessment:  Amarjit Huggins Jr. is a 76 y.o. male with a medical diagnosis of Liver tumor and presents with + s/s of oral and pharyngeal dysphagia, as well as odynophagia.     Do you have any cultural, spiritual, Yazidi conflicts, given your current situation?: none     Discharge recommendations: Discharge Facility/Level Of Care Needs: nursing facility, skilled (pending progress overall )     Goals:   SLP Goals        Problem: SLP Goal    Goal Priority Disciplines Outcome   SLP Goal     SLP Ongoing (interventions implemented as appropriate)   Description:  Short Term Goals:  1. Pt will participate in ongoing swallow assessment to determine least restrictive diet.   2. Pt will tolerate ice chips and 5 ml of water by tsp or self regulated cup swallow w/ max cues by SLP with no audible aspiration signs.   3. Pt will tolerate tsp bites of pudding only with max cues by SLP with no audible aspiration signs.   4. Pt will successfully participate in Modified Barium Swallow study to radiographically assess swallow function, rule out aspiration and determine safest/least restrictive diet as deemed appropriate by SLP.                  Plan:   Patient to be seen Therapy Frequency: 3 x/week   Plan of Care expires: 02/26/17  Plan of Care reviewed with: patient (son and daughter present)  SLP Follow-up?: Yes  SLP - Next Visit Date: 01/29/17      SLP G-Codes  Functional Assessment Tool Used: NOMS  Score: 1  Functional Limitations:  Swallowing  Swallow Current Status (): MIGDALIA  Swallow Goal Status (): CL    RENEE Gongora, CCC-SLP  01/28/2017

## 2017-01-29 PROBLEM — L89.153 SACRAL DECUBITUS ULCER, STAGE III: Status: ACTIVE | Noted: 2017-01-01

## 2017-01-29 PROBLEM — I44.30 AV BLOCK: Status: ACTIVE | Noted: 2017-01-01

## 2017-01-29 PROBLEM — I46.9 CARDIAC ARREST: Status: ACTIVE | Noted: 2017-01-01

## 2017-01-29 NOTE — PROGRESS NOTES
Patient's family requesting to feed patient chicken broth.  Nurse told family that patient is NPO following the swallow evaluation and that orders were to keep patient NPO.  Doctor notified of patient's family request.  Will continue to monitor.

## 2017-01-29 NOTE — PLAN OF CARE
Problem: Physical Therapy Goal  Goal: Physical Therapy Goal  Goals to be met by: 17     Patient will increase functional independence with mobility by performin. Supine to sit with Stand-by Assistance  2. Sit to supine with Stand-by Assistance  3. Rolling to Left and Right with Modified [place].  4. Sit to stand transfer with Contact Guard Assistance with walker  5. Bed to chair transfer with Minimal Assistance using Rolling Walker  6. Sitting at edge of bed x20 minutes with Supervision  7. Stand for 3 minutes with Stand-by Assistance using Rolling Walker   Outcome: Ongoing (interventions implemented as appropriate)  Goals ongoing

## 2017-01-29 NOTE — PLAN OF CARE
Problem: Patient Care Overview  Goal: Plan of Care Review  Outcome: Ongoing (interventions implemented as appropriate)  Pt AAOx4, no family members at bedside. Initial assessment documented per flowsheet. Pt complains of constant back and bottom pain, turned frequently with wedge to relieve pressure. Pt denies n/v or SOB. Pt kept NPO except meds and ice chips, TPN infusing per MAR to IJ with dressing CDI. Cardiac monitoring and blood glucose checks continued. Safety maintained - will cont to monitor.

## 2017-01-29 NOTE — PT/OT/SLP PROGRESS
Physical Therapy  Treatment    Amarjit Huggins Jr.   MRN: [id number]   Admitting Diagnosis: Liver tumor    PT Received On: 17  PT Start Time: 0950     PT Stop Time: 1018    PT Total Time (min): 28 min       Billable Minutes:  Therapeutic Activity 28    Treatment Type: Treatment  PT/PTA: PTA     PTA Visit Number: 2    PTA vs #1    General Precautions: Standard, aspiration, fall  Orthopedic Precautions: N/A   Braces:      Do you have any cultural, spiritual, Mosque conflicts, given your current situation?: none    Subjective:  Communicated with nsg prior to session.      Pain Ratin/10     Location - Orientation: generalized  Location: back  Pain Addressed: Reposition, Distraction, Pre-medicate for activity  Pain Rating Post-Intervention: 8/10    Objective:   Patient found with: oxygen, peripheral IV, central line    Functional Mobility:  Bed Mobility:   Supine to Sit: Maximum Assistance, With assist of 2  Sit to Supine: Maximum Assistance, Total Assistance, With assist of  2    Transfers:  Sit <> Stand Assistance: Maximum Assistance, Dependent (of 2)  Sit <> Stand Assistive Device:  (HHA)    Gait:   Gait Distance: N/A    Stairs:      Balance:   Static Sit: POOR: Needs MODERATE assist to maintain  Dynamic Sit: FAIR: Cannot move trunk without losing balance  Static Stand: 0: Needs MAXIMAL assist to maintain   Dynamic stand: 0: N/A     Therapeutic Activities and Exercises: pt sat EOB ~10 mins with Min A and L lean,stood long enough with Max A X 2 for linen change,to HOB with Dep A X 2,pct's present to clean pt    AM-PAC 6 CLICK MOBILITY  How much help from another person does this patient currently need?   1 = Unable, Total/Dependent Assistance  2 = A lot, Maximum/Moderate Assistance  3 = A little, Minimum/Contact Guard/Supervision  4 = None, Modified [place]/Independent    Turning over in bed (including adjusting bedclothes, sheets and blankets)?: 2  Sitting down on and standing up from a chair with arms  (e.g., wheelchair, bedside commode, etc.): 2  Moving from lying on back to sitting on the side of the bed?: 2  Need to walk in hospital room?: 1  Climbing 3-5 steps with a railing?: 1    AM-PAC Raw Score CMS G-Code Modifier Level of Impairment Assistance   6 % Total / Unable   7 - 9 CM 80 - 100% Maximal Assist   10 - 14 CL 60 - 80% Moderate Assist   15 - 19 CK 40 - 60% Moderate Assist   20 - 22 CJ 20 - 40% Minimal Assist   23 CI 1-20% SBA / CGA   24 CH 0% Independent/ Mod I     Patient left supine with all lines intact, call button in reach and pct's present.    Assessment: increased assistance required with activity and back pain  Amarjit Huggins Jr. is a 76 y.o. male with a medical diagnosis of Liver tumor and presents with .    Rehab identified problem list/impairments: Rehab identified problem list/impairments: weakness, impaired endurance, impaired functional mobilty, impaired balance, decreased upper extremity function, decreased lower extremity function, pain, impaired skin    Rehab potential is fair.    Activity tolerance: Poor    Discharge recommendations: Discharge Facility/Level Of Care Needs: nursing facility, skilled     Barriers to discharge: Barriers to Discharge: Inaccessible home environment, Decreased caregiver support    Equipment recommendations: Equipment Needed After Discharge:  (per SNF)     GOALS: see general POC  Physical Therapy Goals        Problem: Physical Therapy Goal    Goal Priority Disciplines Outcome Goal Variances Interventions   Physical Therapy Goal     PT/OT, PT Ongoing (interventions implemented as appropriate)     Description:  Goals to be met by: 17     Patient will increase functional independence with mobility by performin. Supine to sit with Stand-by Assistance  2. Sit to supine with Stand-by Assistance  3. Rolling to Left and Right with Modified [place].  4. Sit to stand transfer with Contact Guard Assistance with walker  5. Bed to chair transfer  with Minimal Assistance using Rolling Walker  6. Sitting at edge of bed x20 minutes with Supervision  7. Stand for 3 minutes with Stand-by Assistance using Rolling Walker                PLAN:    Patient to be seen 6 x/week  to address the above listed problems via gait training, therapeutic activities, therapeutic exercises, wheelchair management/training  Plan of Care expires: 02/26/17  Plan of Care reviewed with: patient         Yeison Wall, PTA  01/29/2017

## 2017-01-29 NOTE — PROGRESS NOTES
Progress Note  General Surgery    Admit Date: 1/25/2017  Post-operative Day:    Hospital Day: 5    SUBJECTIVE:     Patient developed a fever this morning of 103.0 at 0500 but otherwise had no issues throughout the night. Blood cultures, CXR and CBC performed at that time and Tylenol given. Patient had HD yesterday without any issues. Patient only reports pain to his buttocks but no other discomfort mentioned on my exam. Oncology saw patient and did not feel patient would be a candidate for therapy due to comorbidities and that liver biopsy not necessary unless warranted for histological confirmation. Failed speech eval yesterday and was told to only have ice chips with a spoon.       Scheduled Meds:   sodium chloride 0.9%   Intravenous Once    sodium chloride 0.9%   Intravenous Once    albuterol sulfate  2.5 mg Nebulization Q4H    amiodarone  200 mg Oral BID    amlodipine  5 mg Oral Daily    enoxaparin  30 mg Subcutaneous Daily    fluticasone-vilanterol  1 puff Inhalation Daily    furosemide  40 mg Oral Daily    hydrALAZINE  50 mg Oral BID    latanoprost  1 drop Both Eyes QHS    lisinopril  10 mg Oral Daily    metOLazone  2.5 mg Oral Daily    metoprolol succinate  50 mg Oral Daily    oxybutynin  10 mg Oral Daily    silver sulfADIAZINE 1%   Topical (Top) Daily    sodium chloride 0.9%  3 mL Intravenous Q8H    spironolactone  25 mg Oral Daily    tamsulosin  0.4 mg Oral Daily     Continuous Infusions:   Amino acid 2.75% - dextrose 10% (CLINIMIX-E) solution with additives ( 1L provides 27.5 gm AA, 100 gm CHO (340 kcal/L dextrose), Na 35, K 30, Mg 5, Ca 4.5, Acetate 51, Cl 39, Phos 15) 50 mL/hr at 01/28/17 2100     PRN Meds:sodium chloride 0.9%, sodium chloride 0.9%, acetaminophen, acetaminophen, albumin human 25%, albumin human 25%, albuterol, benzonatate, dextrose 50%, dextrose 50%, diphenhydrAMINE, glucagon (human recombinant), hydrocodone-acetaminophen 5-325mg, flu vacc [id number] 65yr up(PF),  insulin aspart, meclizine, ondansetron, promethazine (PHENERGAN) IVPB, ramelteon, tizanidine    Review of patient's allergies indicates:   Allergen Reactions    Hytrin [terazosin] Shortness Of Breath    Crestor [rosuvastatin] Other (See Comments)     Muscle cramps    Vytorin 10-10 [ezetimibe-simvastatin] Other (See Comments)     Muscle cramps       OBJECTIVE:       Vital Signs Range (Last 24H):  Temp:  [97.5 °F (36.4 °C)-103 °F (39.4 °C)]   Pulse:  [71-98]   Resp:  [14-36]   BP: ()/(39-65)   SpO2:  [93 %-98 %]     I & O (Last 24H):    Intake/Output Summary (Last 24 hours) at 01/29/17 1001  Last data filed at 01/29/17 0600   Gross per 24 hour   Intake             1950 ml   Output             1000 ml   Net              950 ml     Physical Exam:  Mild discomfort 2/2 to buttock pain  Non labored breathing, coarse breath sounds  RRR  Abdomen: distended abdomen but soft with palpable mass to RUQ, no rebound, no guarding  MSKL: swelling to bilateral upper and RLE, Vascular access site to LUE with bandage in place with no drainage  Back: Stage II Sacral decubitus ulcer with eschar in place    Laboratory:  Lab Results   Component Value Date    WBC 14.20 (H) 01/29/2017    HGB 8.3 (L) 01/29/2017    HCT 26.5 (L) 01/29/2017    MCV 87 01/29/2017    PLT 95 (L) 01/29/2017     CMP  Sodium   Date Value Ref Range Status   01/29/2017 131 (L) 136 - 145 mmol/L Final     Potassium   Date Value Ref Range Status   01/29/2017 4.8 3.5 - 5.1 mmol/L Final     Chloride   Date Value Ref Range Status   01/29/2017 98 95 - 110 mmol/L Final     CO2   Date Value Ref Range Status   01/29/2017 21 (L) 23 - 29 mmol/L Final     Glucose   Date Value Ref Range Status   01/29/2017 154 (H) 70 - 110 mg/dL Final     BUN, Bld   Date Value Ref Range Status   01/29/2017 34 (H) 8 - 23 mg/dL Final     Creatinine   Date Value Ref Range Status   01/29/2017 4.0 (H) 0.5 - 1.4 mg/dL Final     Calcium   Date Value Ref Range Status   01/29/2017 8.6 (L) 8.7 - 10.5  mg/dL Final     Total Protein   Date Value Ref Range Status   01/29/2017 5.6 (L) 6.0 - 8.4 g/dL Final     Albumin   Date Value Ref Range Status   01/29/2017 2.0 (L) 3.5 - 5.2 g/dL Final     Total Bilirubin   Date Value Ref Range Status   01/29/2017 0.9 0.1 - 1.0 mg/dL Final     Comment:     For infants and newborns, interpretation of results should be based  on gestational age, weight and in agreement with clinical  observations.  Premature Infant recommended reference ranges:  Up to 24 hours.............<8.0 mg/dL  Up to 48 hours............<12.0 mg/dL  3-5 days..................<15.0 mg/dL  6-29 days.................<15.0 mg/dL       Alkaline Phosphatase   Date Value Ref Range Status   01/29/2017 328 (H) 55 - 135 U/L Final     AST   Date Value Ref Range Status   01/29/2017 58 (H) 10 - 40 U/L Final     ALT   Date Value Ref Range Status   01/29/2017 17 10 - 44 U/L Final     Anion Gap   Date Value Ref Range Status   01/29/2017 12 8 - 16 mmol/L Final     eGFR if    Date Value Ref Range Status   01/29/2017 16 (A) >60 mL/min/1.73 m^2 Final     eGFR if non    Date Value Ref Range Status   01/29/2017 14 (A) >60 mL/min/1.73 m^2 Final     Comment:     Calculation used to obtain the estimated glomerular filtration  rate (eGFR) is the CKD-EPI equation. Since race is unknown   in our information system, the eGFR values for   -American and Non--American patients are given   for each creatinine result.           AFP: > 80,000    ASSESSMENT/PLAN:     Amarjit Huggins is a 77 yo M with Liver mass and ESRD on HD MWF admitted for direct transfer with sob and request for liver biopsy  - Leukocytosis - start on empiric antibiotics  - AFP > 80k, likely HCC and biopsy may not be necessary and may cause further harm to patient  - HD per renal   - monitor BP  - pain control  - SW working on placement for long term supportive care    Nikko Ball MD

## 2017-01-29 NOTE — PROGRESS NOTES
Temp was noted to be 103.4, retook in 30 mins to find it to be 103.0. Notified Dr. Ball, blood cultures, CXR and acetaminophen suppository ordered. Pt denies any needs at this time. Will cont to monitor.

## 2017-01-29 NOTE — PLAN OF CARE
Problem: Patient Care Overview  Goal: Plan of Care Review  SpO2   96% on   2lpm NC.Pt getting  450mL of predicted   2500mL on incentive spirometry. Pt encouraged to continue deep breathing and coughing. No apparent distress noted. Will continue to monitor

## 2017-01-29 NOTE — PROGRESS NOTES
Pt's temp was 102.5 and respirations were 40 on rounds with KEIRA Montoya. Notified Dr. Ball, ordered to change mepilex on pressure ulcer on sacrum and take a look at it. KEIRA Montoya covered pt with cold towels and temp went down to 98.5.

## 2017-01-29 NOTE — PROGRESS NOTES
"Progress Note  [facility]    Admit Date: 1/25/2017   LOS: 4 days     SUBJECTIVE:     Follow-up For:  ESRD    Review of Systems:  Daughter is crying and upset because her father is not improving "I know.. He is going to die"  He was getting changed he has sacral wound stage 1.  OBJECTIVE:     Vital Signs (Most Recent)  Temp: 98.6 °F (37 °C) (01/29/17 0843)  Pulse: 88 (01/29/17 0851)  Resp: (!) 28 (01/29/17 0851)  BP: (!) 142/65 (01/29/17 0843)  SpO2: (!) 94 % (01/29/17 0851)    Vital Signs Range (Last 24H):  Temp:  [97.5 °F (36.4 °C)-103 °F (39.4 °C)]   Pulse:  [71-98]   Resp:  [14-36]   BP: ()/(39-65)   SpO2:  [93 %-98 %]        Physical Exam:   Gen: cachexia  CVS: no murmurs  Lungs decrease breath sounds  Abdomen soft +BS/ascites  Ext: + 2 pitting edema + anasarca    Laboratory:  CBC:     Recent Labs  Lab 01/29/17  0501   WBC 14.20*   RBC 3.05*   HGB 8.3*   HCT 26.5*   PLT 95*   MCV 87   MCH 27.2   MCHC 31.3*     BMP:     Recent Labs  Lab 01/29/17  0501   *   CL 98   CO2 21*   BUN 34*   CREATININE 4.0*   CALCIUM 8.6*   MG 1.8     CMP:     Recent Labs  Lab 01/29/17  0501   *   CALCIUM 8.6*   ALBUMIN 2.0*   PROT 5.6*   *   K 4.8   CO2 21*   CL 98   BUN 34*   CREATININE 4.0*   ALKPHOS 328*   ALT 17   AST 58*   BILITOT 0.9     LFTs:     Recent Labs  Lab 01/29/17  0501   ALT 17   AST 58*   ALKPHOS 328*   BILITOT 0.9   PROT 5.6*   ALBUMIN 2.0*         ASSESSMENT/PLAN:     Active Hospital Problems    Diagnosis  POA    *Liver tumor [D49.0]  Yes    Sacral decubitus ulcer, stage III [L89.153]  Yes    HCC (hepatocellular carcinoma) [C22.0]  Yes    Edema due to malnutrition [E43]  Yes    Malnutrition compromising bodily function [E46]  Yes    Cirrhosis of liver with ascites [K74.60]  Yes    Thrombocytopenia [D69.6]  Yes    Anemia of chronic renal failure, stage 5 [N18.5, D63.1]  Yes    Anticoagulated by anticoagulation treatment [Z79.01]  Not Applicable    ESRD (end stage renal disease) " [N18.6]  Yes    Aspiration into airway [T17.908A]  Yes    Edema [R60.9]  Yes    Chronic obstructive pulmonary disease [J44.9]  Yes    Pleural effusion [J90]  Yes      Resolved Hospital Problems    Diagnosis Date Resolved POA   No resolved problems to display.     1- ESRD on HD  2- hyponatremia d/t hypervolemia ? d/t CHF vs liver cirrhosis  3- liver mass with elevated AFP consistent with HCC  4- CAD s/p PCI on dual antiplatelet therapy  5- DM  6- COPD  7- severe protein calorie malnutrition   8- hypotension during HD probably d/t intravascular depletion      Plan:      -- will plan for HD session in am x 3 hrs and UF goal 2-3 liters  -- could use albumin 25% IV during HD if MAP <60  -- recommend adding midodrine 5 mg p.o TID  -- daily labs and weight  -- consult nutrition.     Maryse Bello MD  [facility] PGY-4

## 2017-01-29 NOTE — CLINICAL REVIEW
Code Blue called RT at bedside. RT assisted with CPR and intubation of the patient. ACLS standards followed. Patient intubated with a 7.5 ET tube at the 24. EtCo2 positive, breath sound bilateral and equal. See nurse's code sheet for details.

## 2017-01-29 NOTE — CODE DOCUMENTATION
76 y.o. with hepatocellular carcinoma and renal failure being treated by Dr. Conrad who was noted on telemetry to have asystole.  Patient was pulseless and Code Blue called.  Patient received chest compressions and intubation performed by ER team.  Patient had PEA on monitor with response to epinephrine and atropine allowing for return of good pulsation in arms and legs.  No purposeful movement after return of circulation.    Family authorized DNR status during resuscitation.  Current therapies will be continued including mechanical ventilation.  Will not treat justin or tachyarrhythmias or provide defibrillation or pacing therapies in accordance with family wishes.  The DNR discussion with family was conducted by Dr. EPI Conrad.    Etiology of asystole is not immediately clear.  Consider pulmonary embolism or CVA.  Keep K+ >4 and Mg > 2.  Neurologic status is unclear; hypothermia not favored at this time due to immediate resuscitative efforts.    CXR this morning:  Bilateral pleural effusions.      ADDENDUM:  Patient seen in ICU moving all extremities and responding purposefully.  Remains intubated.    EKG done at 12:59PM showing marked 1st degree AV block with right bundle branch block and QTC of 509 msec.  No other overt ischemic ECG changes.    Bradyarrhythmia may be related to conduction disease; pacemaker may be required.  Will ask Dr. Syed to review tomorrow.  Telemetry strips at time of event would have been illuminating but apparently we cannot obtain these.    [facility]FF:   Time spent obtaining concurrence on treatment plan and providing critical care given cardiac dysfunction:  35 minutes.    Jim Chavira MD

## 2017-01-29 NOTE — PLAN OF CARE
1300 Pt received from floor after cardiac arrest. DNR paper signed.  1315 Dr. Ball notified of BP 56/33. No vasopressors ordered due to DNR status. Morphine ordered to keep patient comfortable.  1323 Patient asystole on monitor. Family called to bedside and MD notified.

## 2017-01-29 NOTE — PROGRESS NOTES
Patient is transferred to ICU per Nursing and RT staff.  Report given to Starr.  Dr. Conrad talked to family members.  DNR paper signed.

## 2017-01-29 NOTE — PROGRESS NOTES
This note also relates to the following rows which could not be included:  Exhaled Vt - Cannot attach notes to unvalidated device data  Ventilation Type - Cannot attach notes to unvalidated device data  Vent Mode - Cannot attach notes to unvalidated device data  Set Rate - Cannot attach notes to unvalidated device data  Vt Set - Cannot attach notes to unvalidated device data  PEEP/CPAP - Cannot attach notes to unvalidated device data  Pressure Support - Cannot attach notes to unvalidated device data  Waveform - Cannot attach notes to unvalidated device data  Peak Flow - Cannot attach notes to unvalidated device data  Set Inspiratory Pressure - Cannot attach notes to unvalidated device data  Insp Time - Cannot attach notes to unvalidated device data  Plateau Set/Insp. Hold (sec) - Cannot attach notes to unvalidated device data  Insp Rise Time  - Cannot attach notes to unvalidated device data  Trigger Sensitivity Flow/I-Trigger - Cannot attach notes to unvalidated device data  Peak Airway Pressure - Cannot attach notes to unvalidated device data  Mean Airway Pressure - Cannot attach notes to unvalidated device data  Total Ve - Cannot attach notes to unvalidated device data  Spont Ve - Cannot attach notes to unvalidated device data  I:E Ratio Measured - Cannot attach notes to unvalidated device data  Resp Rate High Alarm - Cannot attach notes to unvalidated device data  Press High Alarm - Cannot attach notes to unvalidated device data  Apnea Rate - Cannot attach notes to unvalidated device data  Apnea Volume (mL) - Cannot attach notes to unvalidated device data  Apnea Oxygen Concentration  - Cannot attach notes to unvalidated device data  Apnea Flow Rate (L/min) - Cannot attach notes to unvalidated device data  T Apnea - Cannot attach notes to unvalidated device data    Pt. Passed vent turned off.

## 2017-01-29 NOTE — PROGRESS NOTES
Pt. Received on vent with settings on the flow sheet. Pt. Sleeping in no apparent distress.  Vent check ok, alarms working and are audible. Vent volumes ok.

## 2017-01-29 NOTE — PLAN OF CARE
Problem: Patient Care Overview  Goal: Plan of Care Review  Outcome: Ongoing (interventions implemented as appropriate)  Pt received on nasal cannula at 2  lpm.  SPO2   94%.  Pt in no apparent respiratory distress.  Will continue to monitor.

## 2017-01-30 ENCOUNTER — DOCUMENTATION ONLY (OUTPATIENT)
Dept: NEUROLOGY | Facility: HOSPITAL | Age: 77
End: 2017-01-30

## 2017-01-30 DIAGNOSIS — I46.9 CARDIAC ARREST: Primary | ICD-10-CM

## 2017-01-30 NOTE — DISCHARGE SUMMARY
This note has been moved from another encounter. If you have any questions, please contact [facility] at [phone]. Thank you.

## 2017-01-30 NOTE — DISCHARGE SUMMARY
[facility]  Discharge Summary     Patient ID:  Amarjit Huggins Jr.  [id number]  76 y.o.  1940    Admit date: 2017  Death Date and Time:     Admitting Physician: JONG lemus     Discharge Provider: SALLIE Lemus    Reason for Admission: Liver tumor  Admission Condition: poor    Procedures Performed: hemodialysis    Hospital Course This patient was transferred from an outside hospital with sa suspicion of hepatic malignancy.  He also has ESRD/chronic renal failure , heart failure. Coronary artery disease, and is on hemodialysis.  Pertinent in his history is that he stopped eating 2 weeks ago.  Is main complaint is abdominal pain and fatigue.  The patient was severely deconditioned , hypoalbuminemic with weeping skin on all extremeties secondary to edema, unrelieved with daily dialysis. He had cirrhosis and coronary artery disease.  He refused to eat, refused liquid supplements, and only wanted ice chips. He innitially refused  an IV for TPN but later acquiesced. His liver tumor was poorly visualized on outside CT but showed perihepatic adenopathy compatible with metastases,  and the tumor(s) non-vizualized on abdominal ultrasound.  He was awaiting a percutaneous liver biopsy when at approximately noon on 2017, he became pulseless, apneic, and CPR  was initiated per ACLS protocol with return of pulse and blood pressure in the 60's systolic.  The dana feto protein level had returned >80,000 , considered diagnostic for hepatocellular carcinoma. The oncologist felt neither chemotherapy nor any other treatment was  an option.  He was not a surgical candidate.  The family understood the hopelessness of his condition and wished all supportive measures be withdrawn, comfort measures only, and the patient was made DNR.  He  at  1323 Hours on 2017.  The family was grateful for his care.  The family did not wish for an autopsy.    Consults: Hematology/Oncology,  nephrology    Significant Diagnostic Studies: labs: AFP level > 80,000.  S. albumin 1.5    Final Diagnoses:    Principal Problem: hepatocellular carcinoma   Secondary Diagnoses: cirrhosis, ESRD, malnutrition severe, heart failure, anasarca    Discharged Condition:     Discharge Exam:  Pulseless, apneic, fixed dilated pupiils, hepatomegaly.    Disposition:         Signed:  SALLIE Conrad  2017  5:08 PM

## 2017-02-03 LAB — BACTERIA BLD CULT: NORMAL

## 2022-04-20 NOTE — PHYSICIAN QUERY
"PT Name: Amarjit Huggins Jr.  MR #: [id number]  Physician Query Form -Integumentary System Clarification     Reviewer  Ext [phone] [place]    This form is a permanent document in the medical record.     Query Date: January 25, 2017  By submitting this query, we are merely seeking further clarification of documentation to reflect the severity of illness of your patient. Please utilize your independent clinical judgment when addressing the question(s) below.    The Medical record reflects the following:   Indicator   Supporting Clinical Findings Location in Medical Record    "Redness" documented      "Decubitus" documented     X "Ulcer" documented Pressure Ulcer coccyx Stage II Flowsheet 1/25    Wound care consult      Medication:      Treatment:      Other:      National Pressure Ulcer Advisory Panel (2007) Pressure Ulcer Definitions & Stages  Stage I:Intact skin with non-blanchable redness of a localized area usually over a bony prominence.  Stage II:Partial thickness loss of dermis presenting as a shallow open ulcer with a red pink wound bed, without slough.  Stage III:Full thickness tissue loss. Subcutaneous fat may be visible but bone, tendon or muscle is not exposed. Slough may be present but does not obscure the depth of tissue loss. May include undermining and tunneling.  Stage IV:Full thickness tissue loss with exposed bone, tendon or muscle. slough or eschar may be present on some parts of the wound bed. Often include undermining and tunneling.   Unstageable:Full thickness tissue loss in which the base of the wound, the true depth, and therefore stage, cannot be determined.   Deep Tissue Injury: Purple or maroon localized area of discolored intact skin or blood-filled blister due to damage of underlying soft tissue from pressure and/or shear.     Doctor, Please specify the diagnosis or diagnoses associated with above clinical findings.    [ ] Decubitus (Pressure) Ulcer (Specify site, stage and Present on " Admission (POA) status)  Site    Stage (see definitions)  POA      [  ] Ankle  [  ] Right  [  ] Left   [  ]Yes  [  ] No  [  ] Undeterminable    [  ] Buttock  [  ] Right  [  ] Left   [  ]Yes  [  ] No  [  ] Undeterminable    [  ] Coccyx  [  ] Right  [  ] Left   [  ]Yes  [  ] No  [  ] Undeterminable    [  ] Elbow   [  ] Right  [  ] Left   [  ]Yes  [  ] No  [  ] Undeterminable    [  ] Head     [  ]Yes  [  ] No  [  ] Undeterminable    [  ] Heel  [  ] Right  [  ] Left   [  ]Yes  [  ] No  [  ] Undeterminable    [  ] Hip  [  ] Right  [  ] Left   [  ]Yes  [  ] No  [  ] Undeterminable    [  ] Sacrum      [  ]Yes  [  ] No  [  ] Undeterminable    [  ] Shoulder blade  [  ] Right  [  ] Left   [  ]Yes  [  ] No  [  ] Undeterminable    [  ] Back  [  ] Right   [  ] Left  [  ] Upper   [  ] Low   [  ]Yes  [  ] No  [  ] Undeterminable    [  ] Other site (specify)     [  ]Yes  [  ] No  [  ] Undeterminable                                                                                                          [ ] Diabetic ulcer secondary to (Specify)  [  ] Neuropathy   [  ] PVD        [  ] Unspecified    [  ] Other/Specify   [  ]Skin ulcer secondary to (Specify)  [  ] Atherosclerotic vascular disease [  ] PVD   [  ] Unspecified  [  ] Other/Specify     [  ] Abscess (Specify site and organism if known)     [  ] Cellulitis (Specify site and organism if known)     [  ] Other (Specify)   [  ] Unable to determine       PACU then floor